# Patient Record
Sex: FEMALE | Race: WHITE | NOT HISPANIC OR LATINO | Employment: FULL TIME | URBAN - METROPOLITAN AREA
[De-identification: names, ages, dates, MRNs, and addresses within clinical notes are randomized per-mention and may not be internally consistent; named-entity substitution may affect disease eponyms.]

---

## 2018-01-24 ENCOUNTER — OFFICE VISIT (OUTPATIENT)
Dept: FAMILY MEDICINE | Facility: CLINIC | Age: 28
End: 2018-01-24
Payer: COMMERCIAL

## 2018-01-24 VITALS
WEIGHT: 124.6 LBS | TEMPERATURE: 97.9 F | BODY MASS INDEX: 20.03 KG/M2 | HEART RATE: 80 BPM | SYSTOLIC BLOOD PRESSURE: 127 MMHG | DIASTOLIC BLOOD PRESSURE: 72 MMHG | HEIGHT: 66 IN

## 2018-01-24 DIAGNOSIS — Z12.4 CERVICAL CANCER SCREENING: ICD-10-CM

## 2018-01-24 DIAGNOSIS — J30.9 CHRONIC ALLERGIC RHINITIS, UNSPECIFIED SEASONALITY, UNSPECIFIED TRIGGER: ICD-10-CM

## 2018-01-24 DIAGNOSIS — Z30.41 SURVEILLANCE OF PREVIOUSLY PRESCRIBED CONTRACEPTIVE PILL: ICD-10-CM

## 2018-01-24 DIAGNOSIS — Z00.00 ROUTINE GENERAL MEDICAL EXAMINATION AT A HEALTH CARE FACILITY: Primary | ICD-10-CM

## 2018-01-24 PROCEDURE — G0145 SCR C/V CYTO,THINLAYER,RESCR: HCPCS | Performed by: FAMILY MEDICINE

## 2018-01-24 PROCEDURE — 99385 PREV VISIT NEW AGE 18-39: CPT | Performed by: FAMILY MEDICINE

## 2018-01-24 RX ORDER — NORGESTIMATE AND ETHINYL ESTRADIOL 0.25-0.035
1 KIT ORAL
COMMUNITY
Start: 2017-12-19 | End: 2018-01-24

## 2018-01-24 RX ORDER — FLUTICASONE PROPIONATE 50 MCG
1-2 SPRAY, SUSPENSION (ML) NASAL DAILY
Qty: 1 BOTTLE | Refills: 11 | Status: SHIPPED | OUTPATIENT
Start: 2018-01-24 | End: 2020-03-09

## 2018-01-24 RX ORDER — NORGESTIMATE AND ETHINYL ESTRADIOL 0.25-0.035
1 KIT ORAL DAILY
Qty: 84 TABLET | Refills: 3 | Status: SHIPPED | OUTPATIENT
Start: 2018-01-24 | End: 2019-02-18

## 2018-01-24 NOTE — LETTER
February 2, 2018      Lizabeth Dye  [address] [facility] [address]    Dear ,      I am happy to inform you that your recent cervical cancer screening test (PAP smear) was normal.      Preventative screenings such as this help to ensure your health for years to come. You should repeat a pap smear in 3 years, unless otherwise directed.      You will still need to return to the clinic every year for your annual exam and other preventive tests.     Please contact the clinic at [phone] if you have further questions.       Sincerely,      April Velazco MD/justin

## 2018-01-24 NOTE — MR AVS SNAPSHOT
After Visit Summary   1/24/2018    Lizabeth Dye    MRN: [id number]           Patient Information     Date Of Birth          1990        Visit Information        Provider Department      1/24/2018 5:00 PM April Velazco MD [facility]        Today's Diagnoses     Routine general medical examination at a health care facility    -  1    Cervical cancer screening        Surveillance of previously prescribed contraceptive pill        Chronic allergic rhinitis, unspecified seasonality, unspecified trigger          Care Instructions      Preventive Health Recommendations  Female Ages 26 - 39  Yearly exam:   See your health care provider every year in order to    Review health changes.     Discuss preventive care.      Review your medicines if you your doctor has prescribed any.    Until age 30: Get a Pap test every three years (more often if you have had an abnormal result).    After age 30: Talk to your doctor about whether you should have a Pap test every 3 years or have a Pap test with HPV screening every 5 years.   You do not need a Pap test if your uterus was removed (hysterectomy) and you have not had cancer.  You should be tested each year for STDs (sexually transmitted diseases), if you're at risk.   Talk to your provider about how often to have your cholesterol checked.  If you are at risk for diabetes, you should have a diabetes test (fasting glucose).  Shots: Get a flu shot each year. Get a tetanus shot every 10 years.   Nutrition:     Eat at least 5 servings of fruits and vegetables each day.    Eat whole-grain bread, whole-wheat pasta and brown rice instead of white grains and rice.    Talk to your provider about Calcium and Vitamin D.     Lifestyle    Exercise at least 150 minutes a week (30 minutes a day, 5 days of the week). This will help you control your weight and prevent disease.    Limit alcohol to one drink per day.    No smoking.     Wear sunscreen to prevent skin  "cancer.    See your dentist every six months for an exam and cleaning.            Follow-ups after your visit        Follow-up notes from your care team     Return in about 1 year (around 2019) for Physical Exam and as needed throughout the year.      Who to contact     Normal or non-critical lab and imaging results will be communicated to you by [email or web address], letter or phone within 4 business days after the clinic has received the results. If you do not hear from us within 7 days, please contact the clinic through MyChart or phone. If you have a critical or abnormal lab result, we will notify you by phone as soon as possible.  Submit refill requests through Taasera or call your pharmacy and they will forward the refill request to us. Please allow 3 business days for your refill to be completed.          If you need to speak with a  for additional information , please call: [phone]             Additional Information About Your Visit        Taasera Information     Ta[employer]era lets you send messages to your doctor, view your test results, renew your prescriptions, schedule appointments and more. To sign up, go to [email or web address] . Click on \"Log in\" on the left side of the screen, which will take you to the Welcome page. Then click on \"Sign up Now\" on the right side of the page.     You will be asked to enter the access code listed below, as well as some personal information. Please follow the directions to create your username and password.     Your access code is: [email or web address]  Expires: 2018  5:24 PM     Your access code will  in 90 days. If you need help or a new code, please call your [facility] clinic or [phone].        Care EveryWhere ID     This is your Care EveryWhere ID. This could be used by other organizations to access your [facility] medical records  [id number]        Your Vitals Were     Pulse Temperature Height Last Period Breastfeeding? BMI (Body Mass Index)    80 " "97.9  F (36.6  C) (Tympanic) 5' 5.75\" (1.67 m) 01/17/2018 (Approximate) No 20.26 kg/m2       Blood Pressure from Last 3 Encounters:   01/24/18 127/72    Weight from Last 3 Encounters:   01/24/18 124 lb 9.6 oz (56.5 kg)              We Performed the Following     Pap imaged thin layer screen reflex to HPV if ASCUS - recommend age 25 - 29          Today's Medication Changes          These changes are accurate as of 1/24/18  5:24 PM.  If you have any questions, ask your nurse or doctor.               Start taking these medicines.        Dose/Directions    fluticasone 50 MCG/ACT spray   Commonly known as:  FLONASE   Used for:  Chronic allergic rhinitis, unspecified seasonality, unspecified trigger   Started by:  April Velazco MD        Dose:  1-2 spray   Spray 1-2 sprays into both nostrils daily   Quantity:  1 Bottle   Refills:  11         These medicines have changed or have updated prescriptions.        Dose/Directions    norgestimate-ethinyl estradiol 0.25-35 MG-MCG per tablet   Commonly known as:  MONONESSA   This may have changed:  when to take this   Used for:  Surveillance of previously prescribed contraceptive pill   Changed by:  April Velazco MD        Dose:  1 tablet   Take 1 tablet by mouth daily   Quantity:  84 tablet   Refills:  3            Where to get your medicines      [id number]  [facility] - [address] AT [facility] [address], [place] [address]     Phone:  [phone]     fluticasone 50 MCG/ACT spray    norgestimate-ethinyl estradiol 0.25-35 MG-MCG per tablet                Primary Care Provider Office Phone # Fax #    [facility] [phone] [address] [address]        Equal Access to Services     AUDREY KAMARA AH: Alex weisso Soomaali, waaxda luqadaha, qaybta kaalmada marimar, severino waller. So [facility] " [phone].    ATENCIÓN: Si antonio chase, tiene a carr disposición servicios gratuitos de asistencia lingüística. Anand damico [phone].    We comply with applicable federal civil rights laws and [place] laws. We do not discriminate on the basis of race, color, national origin, age, disability, sex, sexual orientation, or gender identity.            Thank you!     Thank you for choosing [facility]  for your care. Our goal is always to provide you with excellent care. Hearing back from our patients is one way we can continue to improve our services. Please take a few minutes to complete the written survey that you may receive in the mail after your visit with us. Thank you!             Your Updated Medication List - Protect others around you: Learn how to safely use, store and throw away your medicines at www.disposemymeds.org.          This list is accurate as of 1/24/18  5:24 PM.  Always use your most recent med list.                   Brand Name Dispense Instructions for use Diagnosis    cetirizine 10 MG tablet    zyrTEC     Take 10 mg by mouth daily        fluticasone 50 MCG/ACT spray    FLONASE    1 Bottle    Spray 1-2 sprays into both nostrils daily    Chronic allergic rhinitis, unspecified seasonality, unspecified trigger       norgestimate-ethinyl estradiol 0.25-35 MG-MCG per tablet    MONONESSA    84 tablet    Take 1 tablet by mouth daily    Surveillance of previously prescribed contraceptive pill

## 2018-01-29 LAB
COPATH REPORT: NORMAL
PAP: NORMAL

## 2018-02-19 ENCOUNTER — NURSE TRIAGE (OUTPATIENT)
Dept: NURSING | Facility: CLINIC | Age: 28
End: 2018-02-19

## 2019-02-18 DIAGNOSIS — Z30.41 SURVEILLANCE OF PREVIOUSLY PRESCRIBED CONTRACEPTIVE PILL: ICD-10-CM

## 2019-02-18 NOTE — TELEPHONE ENCOUNTER
"Requested Prescriptions   Pending Prescriptions Disp Refills     ESTARYLLA 0.25-35 MG-MCG tablet [Pharmacy Med Name: ESTARYLLA TABLETS 28S] 84 tablet 0    Last Written Prescription Date: 11/20/18  Last Fill Quantity: 84,  # refills: 0   Last office visit: 1/24/2018 with prescribing provider:  Juan A  Future Office Visit:   Next 5 appointments (look out 90 days)    Mar 04, 2019  5:00 PM CST  PHYS[facility] Kristen M Kehr[facility]-C  [facility] [address] [facility] [address] [phone]        Sig: TAKE 1 TABLET BY MOUTH EVERY DAY    Contraceptives Protocol Passed - 2/18/2019  5:41 PM       Passed - Patient is not a current smoker if age is 35 or older       Passed - Recent (12 mo) or future (30 days) visit within the authorizing provider's specialty    Patient had office visit in the last 12 months or has a visit in the next 30 days with authorizing provider or within the authorizing provider's specialty.  See \"Patient Info\" tab in inbasket, or \"Choose Columns\" in Meds & Orders section of the refill encounter.             Passed - Medication is active on med list       Passed - No active pregnancy on record       Passed - No positive pregnancy test in past 12 months        "

## 2019-02-19 RX ORDER — NORGESTIMATE AND ETHINYL ESTRADIOL 0.25-0.035
1 KIT ORAL DAILY
Qty: 30 TABLET | Refills: 0 | Status: SHIPPED | OUTPATIENT
Start: 2019-02-19 | End: 2019-02-19

## 2019-02-19 NOTE — TELEPHONE ENCOUNTER
Routing refill request to provider for review/approval because:  Patient needs to be seen because it has been more than 1 year since last office visit.    Last OV 1/24/18    Medication pended for 30 days with reminder overdue for yearly physical.    Camille Plascencia, RN, BSN

## 2019-03-04 ENCOUNTER — OFFICE VISIT (OUTPATIENT)
Dept: FAMILY MEDICINE | Facility: CLINIC | Age: 29
End: 2019-03-04
Payer: COMMERCIAL

## 2019-03-04 VITALS
DIASTOLIC BLOOD PRESSURE: 73 MMHG | HEART RATE: 81 BPM | BODY MASS INDEX: 19.62 KG/M2 | OXYGEN SATURATION: 99 % | HEIGHT: 67 IN | TEMPERATURE: 98.1 F | SYSTOLIC BLOOD PRESSURE: 114 MMHG | WEIGHT: 125 LBS

## 2019-03-04 DIAGNOSIS — Z30.41 SURVEILLANCE OF PREVIOUSLY PRESCRIBED CONTRACEPTIVE PILL: ICD-10-CM

## 2019-03-04 DIAGNOSIS — Z00.00 ROUTINE GENERAL MEDICAL EXAMINATION AT A HEALTH CARE FACILITY: Primary | ICD-10-CM

## 2019-03-04 DIAGNOSIS — Z91.09 HOUSE DUST MITE ALLERGY: ICD-10-CM

## 2019-03-04 PROCEDURE — 99395 PREV VISIT EST AGE 18-39: CPT | Performed by: PHYSICIAN ASSISTANT

## 2019-03-04 RX ORDER — NORGESTIMATE AND ETHINYL ESTRADIOL 0.25-0.035
1 KIT ORAL DAILY
Qty: 84 TABLET | Refills: 3 | Status: SHIPPED | OUTPATIENT
Start: 2019-03-04 | End: 2019-06-17

## 2019-03-04 RX ORDER — CETIRIZINE HYDROCHLORIDE 10 MG/1
10 TABLET ORAL DAILY
Qty: 90 TABLET | Refills: 3 | Status: SHIPPED | OUTPATIENT
Start: 2019-03-04 | End: 2020-03-09

## 2019-03-04 ASSESSMENT — ENCOUNTER SYMPTOMS
FEVER: 0
CHILLS: 0
HEMATURIA: 0
BREAST MASS: 0
WEAKNESS: 0
DIZZINESS: 0
ABDOMINAL PAIN: 0
SHORTNESS OF BREATH: 0
DYSURIA: 0
HEMATOCHEZIA: 0
JOINT SWELLING: 0
HEADACHES: 0
NERVOUS/ANXIOUS: 0
FREQUENCY: 0
SORE THROAT: 0
NAUSEA: 0
CONSTIPATION: 0
PALPITATIONS: 0
PARESTHESIAS: 0
ARTHRALGIAS: 0
MYALGIAS: 0
HEARTBURN: 0
EYE PAIN: 0
DIARRHEA: 0
COUGH: 0

## 2019-03-04 ASSESSMENT — MIFFLIN-ST. JEOR: SCORE: 1316.69

## 2019-03-04 ASSESSMENT — PAIN SCALES - GENERAL: PAINLEVEL: NO PAIN (0)

## 2019-03-04 NOTE — PROGRESS NOTES
SUBJECTIVE:   CC: Lizabeth Dye is an 28 year old woman who presents for preventive health visit.     Physical   Annual:     Getting at least 3 servings of Calcium per day:  Yes    Bi-annual eye exam:  Yes    Dental care twice a year:  NO    Sleep apnea or symptoms of sleep apnea:  None    Diet:  Regular (no restrictions)    Frequency of exercise:  4-5 days/week    Duration of exercise:  30-45 minutes    Taking medications regularly:  Yes    Medication side effects:  None    Additional concerns today:  Yes    PHQ-2 Total Score: 2              Today's PHQ-2 Score:   PHQ-2 ( 1999 [employer]) 3/4/2019   Q1: Little interest or pleasure in doing things 1   Q2: Feeling down, depressed or hopeless 1   PHQ-2 Score 2   Q1: Little interest or pleasure in doing things Several days   Q2: Feeling down, depressed or hopeless Several days   PHQ-2 Score 2       Abuse: Current or Past(Physical, Sexual or Emotional)- No  Do you feel safe in your environment? Yes    Social History     Tobacco Use     Smoking status: Never Smoker     Smokeless tobacco: Never Used   Substance Use Topics     Alcohol use: Yes     Comment: 3 x/week-wine     Alcohol Use 3/4/2019   If you drink alcohol do you typically have greater than 3 drinks per day OR greater than 7 drinks per week? No   No flowsheet data found.    Reviewed orders with patient.  Reviewed health maintenance and updated orders accordingly - Yes  BP Readings from Last 3 Encounters:   03/04/19 114/73   01/24/18 127/72    Wt Readings from Last 3 Encounters:   03/04/19 56.7 kg (125 lb)   01/24/18 56.5 kg (124 lb 9.6 oz)                  Patient Active Problem List   Diagnosis     Chronic allergic rhinitis, unspecified seasonality, unspecified trigger     Past Surgical History:   Procedure Laterality Date     NO HISTORY OF SURGERY         Social History     Tobacco Use     Smoking status: Never Smoker     Smokeless tobacco: Never Used   Substance Use Topics     Alcohol use: Yes     Comment: 3  x/week-wine     Family History   Problem Relation Age of Onset     Diabetes Father      Thyroid Disease Father      Glaucoma No family hx of      Macular Degeneration No family hx of      Breast Cancer No family hx of      Colon Cancer No family hx of          Current Outpatient Medications   Medication Sig Dispense Refill     cetirizine (ZYRTEC) 10 MG tablet Take 1 tablet (10 mg) by mouth daily 90 tablet 3     norgestimate-ethinyl estradiol (ESTARYLLA) 0.25-35 MG-MCG tablet Take 1 tablet by mouth daily 84 tablet 3     fluticasone (FLONASE) 50 MCG/ACT spray Spray 1-2 sprays into both nostrils daily (Patient not taking: Reported on 3/4/2019) 1 Bottle 11     No Known Allergies    Mammogram not appropriate for this patient based on age.    Pertinent mammograms are reviewed under the imaging tab.  History of abnormal Pap smear:   NO - age 30- 65 PAP every 3 years recommended  Last 3 Pap and HPV Results:   PAP / HPV 1/24/2018   PAP NIL     PAP / HPV 1/24/2018   PAP NIL     Reviewed and updated as needed this visit by clinical staff  Tobacco  Allergies  Meds  Med Hx  Surg Hx  Fam Hx  Soc Hx        Reviewed and updated as needed this visit by Provider        Past Medical History:   Diagnosis Date     Hx of abnormal cervical Pap smear     ASCUS in 2013; nl pap in 4/2015- See care everywhere for details.      Past Surgical History:   Procedure Laterality Date     NO HISTORY OF SURGERY         Review of Systems   Constitutional: Negative for chills and fever.   HENT: Negative for congestion, ear pain, hearing loss and sore throat.    Eyes: Negative for pain and visual disturbance.   Respiratory: Negative for cough and shortness of breath.    Cardiovascular: Negative for chest pain, palpitations and peripheral edema.   Gastrointestinal: Negative for abdominal pain, constipation, diarrhea, heartburn, hematochezia and nausea.   Breasts:  Negative for tenderness, breast mass and discharge.   Genitourinary: Negative for  "dysuria, frequency, genital sores, hematuria, pelvic pain, urgency, vaginal bleeding and vaginal discharge.   Musculoskeletal: Negative for arthralgias, joint swelling and myalgias.   Skin: Negative for rash.   Neurological: Negative for dizziness, weakness, headaches and paresthesias.   Psychiatric/Behavioral: Negative for mood changes. The patient is not nervous/anxious.           OBJECTIVE:   /73   Pulse 81   Temp 98.1  F (36.7  C) (Oral)   Ht 1.689 m (5' 6.5\")   Wt 56.7 kg (125 lb)   LMP  (LMP Unknown)   SpO2 99%   BMI 19.87 kg/m    Physical Exam  GENERAL: healthy, alert and no distress  EYES: Eyes grossly normal to inspection, PERRL and conjunctivae and sclerae normal  HENT: ear canals and TM's normal, nose and mouth without ulcers or lesions  NECK: no adenopathy, no asymmetry, masses, or scars and thyroid normal to palpation  RESP: lungs clear to auscultation - no rales, rhonchi or wheezes  BREAST: normal without masses, tenderness or nipple discharge and no palpable axillary masses or adenopathy  CV: regular rate and rhythm, normal S1 S2, no S3 or S4, no murmur, click or rub, no peripheral edema and peripheral pulses strong  ABDOMEN: soft, nontender, no hepatosplenomegaly, no masses and bowel sounds normal  MS: no gross musculoskeletal defects noted, no edema  SKIN: no suspicious lesions or rashes  NEURO: Normal strength and tone, mentation intact and speech normal  PSYCH: mentation appears normal, affect normal/bright        ASSESSMENT/PLAN:   1. Routine general medical examination at a health care facility  Health maintenance reviewed and updated.    2. House dust mite allergy  Refills given  - cetirizine (ZYRTEC) 10 MG tablet; Take 1 tablet (10 mg) by mouth daily  Dispense: 90 tablet; Refill: 3    3. Surveillance of previously prescribed contraceptive pill  - norgestimate-ethinyl estradiol (ESTARYLLA) 0.25-35 MG-MCG tablet; Take 1 tablet by mouth daily  Dispense: 84 tablet; Refill: " "3    COUNSELING:  Reviewed preventive health counseling, as reflected in patient instructions       Regular exercise       Healthy diet/nutrition       Contraception    BP Readings from Last 1 Encounters:   03/04/19 114/73     Estimated body mass index is 19.87 kg/m  as calculated from the following:    Height as of this encounter: 1.689 m (5' 6.5\").    Weight as of this encounter: 56.7 kg (125 lb).           reports that  has never smoked. she has never used smokeless tobacco.      Counseling Resources:  ATP IV Guidelines  Pooled Cohorts Equation Calculator  Breast Cancer Risk Calculator  FRAX Risk Assessment  ICSI Preventive Guidelines  Dietary Guidelines for Americans, 2010  USDA's MyPlate  ASA Prophylaxis  Lung CA Screening    Kristen M. Kehr, PA-C  [facility]  "

## 2019-03-04 NOTE — NURSING NOTE
"Chief Complaint   Patient presents with     Physical       Initial /73   Pulse 81   Temp 98.1  F (36.7  C) (Oral)   Ht 1.689 m (5' 6.5\")   Wt 56.7 kg (125 lb)   LMP  (LMP Unknown)   SpO2 99%   BMI 19.87 kg/m   Estimated body mass index is 19.87 kg/m  as calculated from the following:    Height as of this encounter: 1.689 m (5' 6.5\").    Weight as of this encounter: 56.7 kg (125 lb).  Medication Reconciliation: complete    MARTINA Cabrera MA    "

## 2019-06-17 ENCOUNTER — OFFICE VISIT (OUTPATIENT)
Dept: OBGYN | Facility: CLINIC | Age: 29
End: 2019-06-17
Payer: COMMERCIAL

## 2019-06-17 VITALS
WEIGHT: 120.8 LBS | DIASTOLIC BLOOD PRESSURE: 70 MMHG | HEART RATE: 80 BPM | SYSTOLIC BLOOD PRESSURE: 109 MMHG | BODY MASS INDEX: 19.21 KG/M2 | OXYGEN SATURATION: 100 %

## 2019-06-17 DIAGNOSIS — N91.2 AMENORRHEA: Primary | ICD-10-CM

## 2019-06-17 DIAGNOSIS — R89.9 ABNORMAL LABORATORY TEST RESULT: ICD-10-CM

## 2019-06-17 LAB
B-HCG SERPL-ACNC: <1 IU/L (ref 0–5)
ESTRADIOL SERPL-MCNC: 85 PG/ML
FSH SERPL-ACNC: 7.1 IU/L
LH SERPL-ACNC: 6.8 IU/L
PROLACTIN SERPL-MCNC: 5 UG/L (ref 3–27)
T4 FREE SERPL-MCNC: 0.8 NG/DL (ref 0.76–1.46)
TSH SERPL DL<=0.005 MIU/L-ACNC: 5.36 MU/L (ref 0.4–4)

## 2019-06-17 PROCEDURE — 83002 ASSAY OF GONADOTROPIN (LH): CPT | Performed by: NURSE PRACTITIONER

## 2019-06-17 PROCEDURE — 84403 ASSAY OF TOTAL TESTOSTERONE: CPT | Performed by: NURSE PRACTITIONER

## 2019-06-17 PROCEDURE — 84146 ASSAY OF PROLACTIN: CPT | Performed by: NURSE PRACTITIONER

## 2019-06-17 PROCEDURE — 84270 ASSAY OF SEX HORMONE GLOBUL: CPT | Performed by: NURSE PRACTITIONER

## 2019-06-17 PROCEDURE — 36415 COLL VENOUS BLD VENIPUNCTURE: CPT | Performed by: NURSE PRACTITIONER

## 2019-06-17 PROCEDURE — 82670 ASSAY OF TOTAL ESTRADIOL: CPT | Performed by: NURSE PRACTITIONER

## 2019-06-17 PROCEDURE — 84439 ASSAY OF FREE THYROXINE: CPT | Performed by: NURSE PRACTITIONER

## 2019-06-17 PROCEDURE — 99203 OFFICE O/P NEW LOW 30 MIN: CPT | Performed by: NURSE PRACTITIONER

## 2019-06-17 PROCEDURE — 83001 ASSAY OF GONADOTROPIN (FSH): CPT | Performed by: NURSE PRACTITIONER

## 2019-06-17 PROCEDURE — 84443 ASSAY THYROID STIM HORMONE: CPT | Performed by: NURSE PRACTITIONER

## 2019-06-17 PROCEDURE — 84702 CHORIONIC GONADOTROPIN TEST: CPT | Performed by: NURSE PRACTITIONER

## 2019-06-17 RX ORDER — ACETAMINOPHEN 325 MG/1
325-650 TABLET ORAL EVERY 6 HOURS PRN
COMMUNITY
End: 2020-03-12

## 2019-06-17 NOTE — PROGRESS NOTES
Subjective     Lizabeth Dye is a 28 year old female who presents to clinic today for the following health issues:    HPI   Patient stopped combined oral contraceptive pills in April. Had a normal 5 day bleed at the end of that pack. Had been on combined oral contraceptive pills for approximately 10 years-started due to dysmenorrhea. Stopped in April as they desire pregnancy at this time. Prior to being on oral contraceptive pills, thinks her cycles were regular-just painful. Denies any STI history. Has had a colposcopy for abnormal pap smear over 5 years ago.  No prior history of ovarian cysts. Both parents have thyroid disorders. No personal thyroid history.   No increase stress recently, no major diet or weight changes.     Patient Active Problem List   Diagnosis     Chronic allergic rhinitis, unspecified seasonality, unspecified trigger     Past Surgical History:   Procedure Laterality Date     NO HISTORY OF SURGERY         Social History     Tobacco Use     Smoking status: Never Smoker     Smokeless tobacco: Never Used   Substance Use Topics     Alcohol use: Yes     Comment: 3 x/week-wine     Family History   Problem Relation Age of Onset     Breast Cancer Mother      Diabetes Father      Thyroid Disease Father      Glaucoma No family hx of      Macular Degeneration No family hx of      Colon Cancer No family hx of          Reviewed and updated as needed this visit by Provider  Tobacco  Allergies  Meds  Problems  Med Hx  Surg Hx  Fam Hx  Soc Hx          Review of Systems   ROS COMP: Constitutional, HEENT, cardiovascular, pulmonary, gi and gu systems are negative, except as otherwise noted.      Objective    /70   Pulse 80   Wt 54.8 kg (120 lb 12.8 oz)   LMP 04/10/2019   SpO2 100%   BMI 19.21 kg/m    Body mass index is 19.21 kg/m .  Physical Exam   GENERAL: healthy, alert and no distress  RESP: lungs clear to auscultation - no rales, rhonchi or wheezes  CV: regular rate and rhythm, normal S1 S2,  no S3 or S4, no murmur, click or rub, no peripheral edema and peripheral pulses strong  ABDOMEN: soft, nontender, no hepatosplenomegaly, no masses and bowel sounds normal   (female): normal female external genitalia, normal urethral meatus, vaginal mucosa, normal cervix/adnexa/uterus without masses or discharge  MS: no gross musculoskeletal defects noted, no edema  SKIN: no suspicious lesions or rashes  PSYCH: mentation appears normal, affect normal/bright     Assessment & Plan     1. Amenorrhea  We discussed possible etiologies of her absent cycles. Discussed rationale for below labs. Discussed management if all labs normal/negative. Discussed recommendation for cycle in the next 1-2 months. For now, if her labs are reassuring, she would like to give it one more month to see if a cycle begins on its own. If one does not, will call and we will do a Prometrium prescription for 7 days to attempt to induce cycle. After that, she would likely want to monitor cycles.  Will start PNV. Patient is given an opportunity to ask questions and have them answered.  - Follicle stimulating hormone  - Lutropin  - Prolactin  - Testosterone Free and Total  - TSH with free T4 reflex  - Estradiol  - HCG quantitative pregnancy     JESUS Carrasquillo [facility]

## 2019-06-19 LAB
SHBG SERPL-SCNC: 106 NMOL/L (ref 30–135)
TESTOST FREE SERPL-MCNC: 0.18 NG/DL (ref 0.08–0.74)
TESTOST SERPL-MCNC: 27 NG/DL (ref 8–60)

## 2019-07-08 ENCOUNTER — TELEPHONE (OUTPATIENT)
Dept: OBGYN | Facility: CLINIC | Age: 29
End: 2019-07-08

## 2019-07-08 DIAGNOSIS — N91.2 AMENORRHEA: ICD-10-CM

## 2019-07-08 DIAGNOSIS — N91.2 AMENORRHEA: Primary | ICD-10-CM

## 2019-07-08 NOTE — TELEPHONE ENCOUNTER
1. Amenorrhea  We discussed possible etiologies of her absent cycles. Discussed rationale for below labs. Discussed management if all labs normal/negative. Discussed recommendation for cycle in the next 1-2 months. For now, if her labs are reassuring, she would like to give it one more month to see if a cycle begins on its own. If one does not, will call and we will do a Prometrium prescription for 7 days to attempt to induce cycle. After that, she would likely want to monitor cycles.  Will start PNV. Patient is given an opportunity to ask questions and have them answered.    From OV 06/17/2019 with Vanessa Zavala. Routing to provider.     Pauline Jeffery RN on 7/8/2019 at 4:17 PM

## 2019-07-08 NOTE — TELEPHONE ENCOUNTER
[facility]. Patient to take 1 tablet before bed x 7 nights. To expect menstrual cycle within 7-10 days of finishing medication. If no menstrual bleeding or spotting even by 2 weeks after finishing medication, would recommend follow up. Thank you. Vanessa LEE CNP

## 2019-07-08 NOTE — TELEPHONE ENCOUNTER
Patient was told by Vanessa there was a pill she could take to jumpstart her period after going off the pill. Could she try that medication? Patient thought maybe there was a lab she needed first or something. Please advise.Ok to leave a detailed message.

## 2019-07-09 NOTE — TELEPHONE ENCOUNTER
Refill not appropriate.  Rx sent to the requesting pharmacy on 7/8/19 for a 7 day supply with an additional 0 refills.    Julieta Tyler RN

## 2020-03-09 ENCOUNTER — OFFICE VISIT (OUTPATIENT)
Dept: FAMILY MEDICINE | Facility: CLINIC | Age: 30
End: 2020-03-09
Payer: COMMERCIAL

## 2020-03-09 VITALS
WEIGHT: 122 LBS | HEART RATE: 80 BPM | TEMPERATURE: 98 F | BODY MASS INDEX: 19.61 KG/M2 | OXYGEN SATURATION: 100 % | SYSTOLIC BLOOD PRESSURE: 106 MMHG | HEIGHT: 66 IN | DIASTOLIC BLOOD PRESSURE: 67 MMHG

## 2020-03-09 DIAGNOSIS — Z53.9 ERRONEOUS ENCOUNTER--DISREGARD: Primary | ICD-10-CM

## 2020-03-09 ASSESSMENT — ENCOUNTER SYMPTOMS
NERVOUS/ANXIOUS: 0
ABDOMINAL PAIN: 0
MYALGIAS: 1
DIZZINESS: 0
DYSURIA: 0
HEMATURIA: 0
FREQUENCY: 0
CONSTIPATION: 0
HEMATOCHEZIA: 0
JOINT SWELLING: 0
SORE THROAT: 0
HEARTBURN: 0
COUGH: 0
PALPITATIONS: 0
CHILLS: 0
DIARRHEA: 0
BREAST MASS: 0
ARTHRALGIAS: 1
FEVER: 0
WEAKNESS: 0
PARESTHESIAS: 0
SHORTNESS OF BREATH: 0
NAUSEA: 0
EYE PAIN: 0
HEADACHES: 0

## 2020-03-09 ASSESSMENT — PAIN SCALES - GENERAL: PAINLEVEL: NO PAIN (0)

## 2020-03-09 ASSESSMENT — MIFFLIN-ST. JEOR: SCORE: 1282.2

## 2020-03-09 NOTE — NURSING NOTE
"Chief Complaint   Patient presents with     Physical       Initial /67   Pulse 80   Temp 98  F (36.7  C) (Oral)   Ht 1.664 m (5' 5.5\")   Wt 55.3 kg (122 lb)   LMP 02/27/2020   SpO2 100%   BMI 19.99 kg/m   Estimated body mass index is 19.99 kg/m  as calculated from the following:    Height as of this encounter: 1.664 m (5' 5.5\").    Weight as of this encounter: 55.3 kg (122 lb).  Medication Reconciliation: complete    MARTINA Cabrera MA    "

## 2020-03-09 NOTE — PROGRESS NOTES
SUBJECTIVE:     Lizabeth scheduled an appointment today thinking that she was at GYN and wanted to discuss fertility.   She worked with Nessa Zavala NP in GYN last year.       Appointment will be no charge and she was scheduled with the appropriate provider.     Kristen Kehr PA-C

## 2020-03-10 NOTE — PROGRESS NOTES
Subjective     Lizabeth Dye is a 29 year old female who presents to clinic today for the following health issues:    HPI   Infertility    Patient stopped her oral contraceptive pill last April in hopes of becoming pregnant. Seen June 2019 due to amenorrhea. Labs done then did show a slightly elevated TSH, patient did use oral progesterone to induce a cycle. Has been tracking then and they vary from 30-48 days. Uses ovulation tests and the months she has a positive test, it is later in the cycle and has seen most cycles after her positive ovulation, has a shorter luteal phase-less than 2 weeks. Is taking a PNV.  Has been on oral contraceptive pill from high school until last year, not sure what cycles were like prior. She has no history concerning for tubal factor.  Her mother and father both have thyroid disorders.  : Healthy with no chronic medical condition, no medication. He has no other children. Age 34. Works for [employer] for the last year.    Patient Active Problem List   Diagnosis     Chronic allergic rhinitis, unspecified seasonality, unspecified trigger     Past Surgical History:   Procedure Laterality Date     NO HISTORY OF SURGERY         Social History     Tobacco Use     Smoking status: Never Smoker     Smokeless tobacco: Never Used   Substance Use Topics     Alcohol use: Yes     Comment: 3 x/week-wine     Family History   Problem Relation Age of Onset     Breast Cancer Mother      Diabetes Father      Thyroid Disease Father      Glaucoma No family hx of      Macular Degeneration No family hx of      Colon Cancer No family hx of          Reviewed and updated as needed this visit by Provider         Review of Systems   ROS COMP: Constitutional, HEENT, cardiovascular, pulmonary, gi and gu systems are negative, except as otherwise noted.      Objective    /71 (BP Location: Right arm, Patient Position: Sitting, Cuff Size: Adult Regular)   Pulse 76   Temp 97.8  F (36.6  C) (Oral)   Ht  "1.676 m (5' 6\")   Wt 54.3 kg (119 lb 9.6 oz)   LMP 02/27/2020 (Exact Date)   SpO2 99%   BMI 19.30 kg/m    Body mass index is 19.3 kg/m .  Physical Exam   GENERAL: healthy, alert and no distress  RESP: lungs clear to auscultation - no rales, rhonchi or wheezes  CV: regular rate and rhythm, normal S1 S2, no S3 or S4, no murmur, click or rub, no peripheral edema and peripheral pulses strong  ABDOMEN: soft, nontender, no hepatosplenomegaly, no masses and bowel sounds normal  MS: no gross musculoskeletal defects noted, no edema  SKIN: no suspicious lesions or rashes  LYMPH: no cervical, supraclavicular, axillary, or inguinal adenopathy    Assessment & Plan     1. Irregular menses  Reviewed fertility and pregnancy in detail. Discussed rates of pregnancy after 6, 12 and 24 months of actively attempting to conceive. Reviewed tracking cycles and timing intercourse. Will continue her home ovulation tests this cycle and return to clinic approximately 1 week post presumed ovulation and will check progesterone level. With next menses, plan day 3 labs (will enter orders after progesterone completed) and the pelvic ultrasound after menses to rule out structural etiologies. No history concerning for tubal factor, so will hold off on HSG at this time. Consider HSG and SA on partner in the near future. Once all results completed, will follow up with patient. We did review use of medications such as Clomid and Femara for ovulation support. We discussed timing intercourse, continued tracking of cycles. Discussed PNV. Also discussed referral at any time to RE. Patient is given an opportunity to ask questions and have them answered.  - Progesterone; Future  - US Pelvic Complete with Transvaginal; Future    2. Procreative management counseling  Per above  - Progesterone; Future  - US Pelvic Complete with Transvaginal; Future     JESUS Carrasquillo CNP  [facility]      "

## 2020-03-12 ENCOUNTER — OFFICE VISIT (OUTPATIENT)
Dept: OBGYN | Facility: CLINIC | Age: 30
End: 2020-03-12
Payer: COMMERCIAL

## 2020-03-12 VITALS
BODY MASS INDEX: 19.22 KG/M2 | HEART RATE: 76 BPM | WEIGHT: 119.6 LBS | SYSTOLIC BLOOD PRESSURE: 105 MMHG | HEIGHT: 66 IN | TEMPERATURE: 97.8 F | DIASTOLIC BLOOD PRESSURE: 71 MMHG | OXYGEN SATURATION: 99 %

## 2020-03-12 DIAGNOSIS — Z31.69 PROCREATIVE MANAGEMENT COUNSELING: ICD-10-CM

## 2020-03-12 DIAGNOSIS — N92.6 IRREGULAR MENSES: Primary | ICD-10-CM

## 2020-03-12 PROCEDURE — 99213 OFFICE O/P EST LOW 20 MIN: CPT | Performed by: NURSE PRACTITIONER

## 2020-03-12 ASSESSMENT — MIFFLIN-ST. JEOR: SCORE: 1279.25

## 2020-03-12 ASSESSMENT — PAIN SCALES - GENERAL: PAINLEVEL: NO PAIN (0)

## 2020-03-12 NOTE — PATIENT INSTRUCTIONS
"Schedule a lab only appointment 1 week after you have a \"PEAK\" home ovulation test to check your progesterone level.    Once your next menstrual cycle begins, call [phone] to schedule a pelvic ultrasound for after your bleeding is complete.    Once your next menstrual cycle begins, call the clinic to schedule a lab only appointment on cycle day 3.  "

## 2020-03-24 DIAGNOSIS — Z31.69 PROCREATIVE MANAGEMENT COUNSELING: ICD-10-CM

## 2020-03-24 DIAGNOSIS — N92.6 IRREGULAR MENSES: ICD-10-CM

## 2020-03-24 LAB — PROGEST SERPL-MCNC: 23.3 NG/ML

## 2020-03-24 PROCEDURE — 84144 ASSAY OF PROGESTERONE: CPT | Performed by: NURSE PRACTITIONER

## 2020-03-24 PROCEDURE — 36415 COLL VENOUS BLD VENIPUNCTURE: CPT | Performed by: NURSE PRACTITIONER

## 2020-04-03 DIAGNOSIS — Z31.69 PROCREATIVE MANAGEMENT COUNSELING: ICD-10-CM

## 2020-04-03 DIAGNOSIS — N92.6 IRREGULAR MENSES: ICD-10-CM

## 2020-04-03 LAB
ESTRADIOL SERPL-MCNC: 40 PG/ML
FSH SERPL-ACNC: 5.9 IU/L
LH SERPL-ACNC: 3.6 IU/L
PROLACTIN SERPL-MCNC: 8 UG/L (ref 3–27)
T4 FREE SERPL-MCNC: 0.83 NG/DL (ref 0.76–1.46)
TSH SERPL DL<=0.005 MIU/L-ACNC: 8.89 MU/L (ref 0.4–4)

## 2020-04-03 PROCEDURE — 84146 ASSAY OF PROLACTIN: CPT | Performed by: NURSE PRACTITIONER

## 2020-04-03 PROCEDURE — 83002 ASSAY OF GONADOTROPIN (LH): CPT | Performed by: NURSE PRACTITIONER

## 2020-04-03 PROCEDURE — 84403 ASSAY OF TOTAL TESTOSTERONE: CPT | Performed by: NURSE PRACTITIONER

## 2020-04-03 PROCEDURE — 84443 ASSAY THYROID STIM HORMONE: CPT | Performed by: NURSE PRACTITIONER

## 2020-04-03 PROCEDURE — 84439 ASSAY OF FREE THYROXINE: CPT | Performed by: NURSE PRACTITIONER

## 2020-04-03 PROCEDURE — 83001 ASSAY OF GONADOTROPIN (FSH): CPT | Performed by: NURSE PRACTITIONER

## 2020-04-03 PROCEDURE — 84270 ASSAY OF SEX HORMONE GLOBUL: CPT | Performed by: NURSE PRACTITIONER

## 2020-04-03 PROCEDURE — 36415 COLL VENOUS BLD VENIPUNCTURE: CPT | Performed by: NURSE PRACTITIONER

## 2020-04-03 PROCEDURE — 82670 ASSAY OF TOTAL ESTRADIOL: CPT | Performed by: NURSE PRACTITIONER

## 2020-04-07 ENCOUNTER — ANCILLARY PROCEDURE (OUTPATIENT)
Dept: ULTRASOUND IMAGING | Facility: CLINIC | Age: 30
End: 2020-04-07
Attending: NURSE PRACTITIONER
Payer: COMMERCIAL

## 2020-04-07 DIAGNOSIS — Z31.69 PROCREATIVE MANAGEMENT COUNSELING: ICD-10-CM

## 2020-04-07 DIAGNOSIS — N92.6 IRREGULAR MENSES: ICD-10-CM

## 2020-04-07 PROCEDURE — 76830 TRANSVAGINAL US NON-OB: CPT

## 2020-04-07 PROCEDURE — 76856 US EXAM PELVIC COMPLETE: CPT | Mod: 59

## 2020-04-08 ENCOUNTER — TELEPHONE (OUTPATIENT)
Dept: OBGYN | Facility: CLINIC | Age: 30
End: 2020-04-08

## 2020-04-08 LAB
SHBG SERPL-SCNC: 104 NMOL/L (ref 30–135)
TESTOST FREE SERPL-MCNC: 0.16 NG/DL (ref 0.08–0.74)
TESTOST SERPL-MCNC: 23 NG/DL (ref 8–60)

## 2020-04-08 NOTE — TELEPHONE ENCOUNTER
Vanessa,     I spoke to this patient and went over normal ultrasound results.  Patient is wondering with all of her tests being normal, what is the next steps?  Please advise.  Julianna Valentine [facility]  April 8, 2020 8:15 AM

## 2020-04-10 ENCOUNTER — E-VISIT (OUTPATIENT)
Dept: FAMILY MEDICINE | Facility: CLINIC | Age: 30
End: 2020-04-10
Payer: COMMERCIAL

## 2020-04-10 DIAGNOSIS — Z53.9 ERRONEOUS ENCOUNTER--DISREGARD: Primary | ICD-10-CM

## 2020-04-10 NOTE — TELEPHONE ENCOUNTER
Telephone call to patient and discussed lab and ultrasound results. Would recommend she follow up with PCP first to discuss thyroid and whether medication to correct would be beneficial, then will have her follow up to discuss medication to help cycles and fertility. Questions answered. Vanessa LEE CNP

## 2020-04-10 NOTE — TELEPHONE ENCOUNTER
Patient returned call.     RN routing to [facility] for availability to speak with patient.    Katarina Cortés RN on 4/10/2020 at 12:10 PM

## 2020-04-13 ENCOUNTER — E-VISIT (OUTPATIENT)
Dept: FAMILY MEDICINE | Facility: CLINIC | Age: 30
End: 2020-04-13
Payer: COMMERCIAL

## 2020-04-13 DIAGNOSIS — E03.9 HYPOTHYROIDISM, UNSPECIFIED TYPE: Primary | ICD-10-CM

## 2020-04-13 PROCEDURE — 99421 OL DIG E/M SVC 5-10 MIN: CPT | Performed by: PHYSICIAN ASSISTANT

## 2020-04-14 PROBLEM — E03.9 HYPOTHYROIDISM, UNSPECIFIED TYPE: Status: ACTIVE | Noted: 2020-04-14

## 2020-04-14 RX ORDER — LEVOTHYROXINE SODIUM 50 UG/1
50 TABLET ORAL DAILY
Qty: 90 TABLET | Refills: 0 | Status: SHIPPED | OUTPATIENT
Start: 2020-04-14 | End: 2020-06-29

## 2020-06-29 DIAGNOSIS — E03.9 HYPOTHYROIDISM, UNSPECIFIED TYPE: ICD-10-CM

## 2020-06-29 LAB — TSH SERPL DL<=0.005 MIU/L-ACNC: 2.47 MU/L (ref 0.4–4)

## 2020-06-29 PROCEDURE — 36415 COLL VENOUS BLD VENIPUNCTURE: CPT | Performed by: PHYSICIAN ASSISTANT

## 2020-06-29 PROCEDURE — 84443 ASSAY THYROID STIM HORMONE: CPT | Performed by: PHYSICIAN ASSISTANT

## 2020-06-29 RX ORDER — LEVOTHYROXINE SODIUM 50 UG/1
50 TABLET ORAL DAILY
Qty: 90 TABLET | Refills: 3 | Status: SHIPPED | OUTPATIENT
Start: 2020-06-29 | End: 2022-02-04

## 2020-07-06 ENCOUNTER — OFFICE VISIT (OUTPATIENT)
Dept: OBGYN | Facility: CLINIC | Age: 30
End: 2020-07-06
Payer: COMMERCIAL

## 2020-07-06 VITALS
BODY MASS INDEX: 19.24 KG/M2 | OXYGEN SATURATION: 99 % | TEMPERATURE: 98 F | DIASTOLIC BLOOD PRESSURE: 62 MMHG | WEIGHT: 122.6 LBS | SYSTOLIC BLOOD PRESSURE: 100 MMHG | HEART RATE: 76 BPM | HEIGHT: 67 IN

## 2020-07-06 DIAGNOSIS — Z31.9 INFERTILITY MANAGEMENT: Primary | ICD-10-CM

## 2020-07-06 PROCEDURE — 99213 OFFICE O/P EST LOW 20 MIN: CPT | Performed by: NURSE PRACTITIONER

## 2020-07-06 ASSESSMENT — MIFFLIN-ST. JEOR: SCORE: 1300.8

## 2020-07-06 ASSESSMENT — PAIN SCALES - GENERAL: PAINLEVEL: NO PAIN (0)

## 2020-07-06 NOTE — PATIENT INSTRUCTIONS
Instructions for Ovulation Induction with Clomid    1.  Keep a menstrual diary.  Begin counting each month with the first day of bleeding.    2.  Take the Clomid each day on menstrual days 5-9.    3.  Expect ovulation on or about menstrual day # 14.    4.  You should be having intercourse at least every other day from menstrual day  12 - 16.    5.  Contact the office when you have your cycle, we will schedule a progesterone level at the lab for menstrual day 21, 22, or 23.    6.  If you have not had a cycle by day 32, take a pregnancy test.  If it is negative, take the Prometrium (200mg daily for 7 days).  Expect a cycle 5-7 days after finishing the perscription.    7.  Contact our office to determine the next dose of Clomid.

## 2020-07-06 NOTE — PROGRESS NOTES
Subjective     Lizabeth Dye is a 29 year old female who presents to clinic today for the following health issues:    HPI   Infertility    Patient presents for follow up fertility. Stopped oral contraceptive pill April 2019. Had an episode of amenorrhea and was seen approximately 2 months later. Labs done-slightly elevated TSH. Patient did a round of Prometrium in July 2019.   Seen in March and had been using ovulation tests, was getting a positive ovulation, but later in her cycle and was then having a shorter luteal phase. We repeated labs and her TSH was further elevated. Has been seeing FP for this and is on 50 mcg of Synthroid. Last TSH last week and was normal. Would like to move forward with Clomid as we previously discussed.  No history concerning for tubal factor.    planning to follow up with his FP provider for semen analysis.   Since starting the Synthroid, cycles more consistent. Went from 30-48 days prior to now 29, 28 and 30 days. Still having ovulation later in her cycle (day 17-19). Most recent cycle was slightly heavier than normal and lasted 7 days instead of her usual 5.    Patient Active Problem List   Diagnosis     Chronic allergic rhinitis, unspecified seasonality, unspecified trigger     Hypothyroidism, unspecified type     Past Surgical History:   Procedure Laterality Date     NO HISTORY OF SURGERY         Social History     Tobacco Use     Smoking status: Never Smoker     Smokeless tobacco: Never Used   Substance Use Topics     Alcohol use: Yes     Comment: 3 x/week-wine     Family History   Problem Relation Age of Onset     Breast Cancer Mother      Diabetes Father      Thyroid Disease Father      Glaucoma No family hx of      Macular Degeneration No family hx of      Colon Cancer No family hx of          Reviewed and updated as needed this visit by Provider         Review of Systems   Constitutional, HEENT, cardiovascular, pulmonary, gi and gu systems are negative, except as  "otherwise noted.      Objective    /62 (BP Location: Right arm, Patient Position: Sitting, Cuff Size: Adult Regular)   Pulse 76   Temp 98  F (36.7  C) (Tympanic)   Ht 1.689 m (5' 6.5\")   Wt 55.6 kg (122 lb 9.6 oz)   LMP 06/27/2020 (Exact Date)   SpO2 99%   BMI 19.49 kg/m    Body mass index is 19.49 kg/m .  Physical Exam   GENERAL: healthy, alert and no distress  ABDOMEN: soft, nontender, no hepatosplenomegaly, no masses and bowel sounds normal   (female): normal female external genitalia, normal urethral meatus, normal cervix/adnexa/uterus without masses   MS: no gross musculoskeletal defects noted, no edema  SKIN: no suspicious lesions or rashes  PSYCH: mentation appears normal, affect normal/bright    Assessment & Plan     1. Infertility management  Reviewed her history and her thyroid condition. She would like to move forward with Clomid. We have discussed the possible side effects of clomid including ovarian cyst formation(reversible), increased incidence of multiple pregnancy and, controversy concerning increasd risk of ovarian cancer later on in life. Patient was also advised about the timing of sexual intercourse in relation to clomid. When she begins her next cycle, will call and I will send prescription for Clomid 50mg to take days 5-9 daily. Will plan day 21 progesterone level and standing orders entered. Will do Prometrium only if no menses by day 32 and negative pregnancy test. Discussed sending her to reproductive endocrinologist if we max out on Clomid dose without having evidence of ovulation or if we complete 6 cycles and she will notify me if she wants a referral sooner.  will proceed with SA through his FP provider as they are planning. Patient is given an opportunity to ask questions and have them answered.   - Progesterone; Standing     JESUS Carrasquillo CNP  [facility]      "

## 2020-07-20 ENCOUNTER — TELEPHONE (OUTPATIENT)
Dept: OBGYN | Facility: CLINIC | Age: 30
End: 2020-07-20

## 2020-07-20 DIAGNOSIS — Z31.9 INFERTILITY MANAGEMENT: Primary | ICD-10-CM

## 2020-07-20 RX ORDER — CLOMIPHENE CITRATE 50 MG/1
50 TABLET ORAL DAILY
Qty: 5 TABLET | Refills: 0 | Status: SHIPPED | OUTPATIENT
Start: 2020-07-20 | End: 2020-08-26

## 2020-07-20 NOTE — TELEPHONE ENCOUNTER
Pt last seen on 7/6/2020, pt started her cycle and wants clomid prescription.    RN routing to provider for advisement.    Katarina Cortés RN on 7/20/2020 at 8:48 AM

## 2020-07-20 NOTE — TELEPHONE ENCOUNTER
Reason for Call:  Medication or medication refill:    Do you use a [facility]?  Name of the pharmacy and phone number for the current request:  [id number] - [place], [place] - [address]  [phone]    Name of the medication requested: clomid    Other request: patient is calling stating has started period and has questions in regards to RX above. Please call to discuss. Thank you.    Can we leave a detailed message on this number? YES    Phone number patient can be reached at: Home number on file [phone] (home)    Best Time:     Call taken on 7/20/2020 at 8:01 AM by Sierra Montez

## 2020-07-20 NOTE — TELEPHONE ENCOUNTER
Previously counseled.  Plan per Vanessa = Clomid 50mg to take days 5-9 daily.  Prescription placed.

## 2020-08-13 DIAGNOSIS — Z31.9 INFERTILITY MANAGEMENT: ICD-10-CM

## 2020-08-13 LAB — PROGEST SERPL-MCNC: 4.9 NG/ML

## 2020-08-13 PROCEDURE — 84144 ASSAY OF PROGESTERONE: CPT | Performed by: NURSE PRACTITIONER

## 2020-08-13 PROCEDURE — 36415 COLL VENOUS BLD VENIPUNCTURE: CPT | Performed by: NURSE PRACTITIONER

## 2020-08-19 DIAGNOSIS — Z31.9 INFERTILITY MANAGEMENT: ICD-10-CM

## 2020-08-19 LAB — PROGEST SERPL-MCNC: 46.4 NG/ML

## 2020-08-19 PROCEDURE — 84144 ASSAY OF PROGESTERONE: CPT | Performed by: NURSE PRACTITIONER

## 2020-08-19 PROCEDURE — 36415 COLL VENOUS BLD VENIPUNCTURE: CPT | Performed by: NURSE PRACTITIONER

## 2020-08-25 ENCOUNTER — MYC MEDICAL ADVICE (OUTPATIENT)
Dept: OBGYN | Facility: CLINIC | Age: 30
End: 2020-08-25

## 2020-08-25 DIAGNOSIS — Z31.9 INFERTILITY MANAGEMENT: ICD-10-CM

## 2020-08-26 RX ORDER — CLOMIPHENE CITRATE 50 MG/1
50 TABLET ORAL DAILY
Qty: 5 TABLET | Refills: 0 | Status: SHIPPED | OUTPATIENT
Start: 2020-08-26 | End: 2020-10-22

## 2020-08-26 NOTE — TELEPHONE ENCOUNTER
Per 8/19 progesterone lab result note:  Lizabeth,   Your progesterone level confirms ovulation this cycle-looks like it was later in your cycle. If you are not pregnant, we will plan to keep you on the same dose of Clomid, but have you take it cycle days 3-7 to see if we can get ovulation to occur earlier in the cycle Please let me know if you have any questions.  Sincerely,   Vanessa LEE CNP

## 2020-09-25 ENCOUNTER — MYC MEDICAL ADVICE (OUTPATIENT)
Dept: OBGYN | Facility: CLINIC | Age: 30
End: 2020-09-25

## 2020-09-25 NOTE — TELEPHONE ENCOUNTER
Pt last seen 7/6/2020 for infertility.    Pt asking if she should continue with clomid. Pt states she has not ovulated until CD#20 which is later than when she was not taking clomid.    RN routing to provider for advisement.    Katarina Cortés RN on 9/25/2020 at 4:36 PM

## 2020-09-28 DIAGNOSIS — Z31.9 INFERTILITY MANAGEMENT: ICD-10-CM

## 2020-09-29 NOTE — TELEPHONE ENCOUNTER
clomiPHENE (CLOMID) 50 MG tablet      Last Written Prescription Date:  8/26/2020  Last Fill Quantity: 5,   # refills: 0  Last Office Visit: 7/6/2020  Future Office visit:       Routing refill request to provider for review/approval because:  Drug not on the [facility], [facility] or  Health refill protocol or controlled substance  Looks like JESUS Gray CNP, is waiting to hear back from pt via [facility] on what she would like to do.    This rx request was sent yesterday.    Julieta Tyler RN

## 2020-09-30 RX ORDER — CLOMIPHENE CITRATE 50 MG/1
TABLET ORAL
Refills: 0 | OUTPATIENT
Start: 2020-09-30

## 2020-10-21 ENCOUNTER — MYC MEDICAL ADVICE (OUTPATIENT)
Dept: OBGYN | Facility: CLINIC | Age: 30
End: 2020-10-21

## 2020-10-21 DIAGNOSIS — Z31.9 INFERTILITY MANAGEMENT: ICD-10-CM

## 2020-10-22 RX ORDER — CLOMIPHENE CITRATE 50 MG/1
50 TABLET ORAL DAILY
Qty: 5 TABLET | Refills: 0 | Status: SHIPPED | OUTPATIENT
Start: 2020-10-22 | End: 2020-11-20

## 2020-10-22 NOTE — TELEPHONE ENCOUNTER
Per 9/25 mychart encounter:  So the next step is to let me know once your next cycle begins and we will send in the new Clomid prescription for you to take on days 3-7 and then plan a day 21 progesterone level during that cycle

## 2020-11-10 DIAGNOSIS — Z31.9 INFERTILITY MANAGEMENT: ICD-10-CM

## 2020-11-10 LAB — PROGEST SERPL-MCNC: 24.8 NG/ML

## 2020-11-10 PROCEDURE — 36415 COLL VENOUS BLD VENIPUNCTURE: CPT | Performed by: NURSE PRACTITIONER

## 2020-11-10 PROCEDURE — 84144 ASSAY OF PROGESTERONE: CPT | Performed by: NURSE PRACTITIONER

## 2020-11-20 ENCOUNTER — MYC MEDICAL ADVICE (OUTPATIENT)
Dept: OBGYN | Facility: CLINIC | Age: 30
End: 2020-11-20

## 2020-11-20 DIAGNOSIS — Z31.9 INFERTILITY MANAGEMENT: ICD-10-CM

## 2020-11-20 RX ORDER — CLOMIPHENE CITRATE 50 MG/1
50 TABLET ORAL DAILY
Qty: 5 TABLET | Refills: 0 | Status: SHIPPED | OUTPATIENT
Start: 2020-11-20 | End: 2020-12-21

## 2020-11-20 NOTE — TELEPHONE ENCOUNTER
Pt was last seen in office with JESUS Gray CNP, on 7/6/2020.    Per 10/21 mychart encounter:  Lizabeth,  I sent the prescription to your pharmacy. Please take 1 tablet daily on cycle day 3-7. You can also schedule a lab only appointment on cycle day 21 of this cycle.  Please let me know if you have any questions.  Sincerely,  Vanessa LEE CNP    Per 11/10/2020 day #21 progesterone lab result note:  Lizabeth,  Your progesterone level shows ovulation this cycle. Please let me know if you have any questions.  Sincerely,  Vanessa LEE CNP

## 2020-12-09 DIAGNOSIS — Z31.9 INFERTILITY MANAGEMENT: ICD-10-CM

## 2020-12-09 LAB — PROGEST SERPL-MCNC: 45.2 NG/ML

## 2020-12-09 PROCEDURE — 84144 ASSAY OF PROGESTERONE: CPT | Performed by: NURSE PRACTITIONER

## 2020-12-09 PROCEDURE — 36415 COLL VENOUS BLD VENIPUNCTURE: CPT | Performed by: NURSE PRACTITIONER

## 2020-12-20 ENCOUNTER — HEALTH MAINTENANCE LETTER (OUTPATIENT)
Age: 30
End: 2020-12-20

## 2020-12-20 ENCOUNTER — MYC MEDICAL ADVICE (OUTPATIENT)
Dept: OBGYN | Facility: CLINIC | Age: 30
End: 2020-12-20

## 2020-12-20 DIAGNOSIS — Z31.9 INFERTILITY MANAGEMENT: ICD-10-CM

## 2020-12-20 DIAGNOSIS — E03.9 HYPOTHYROIDISM, UNSPECIFIED TYPE: Primary | ICD-10-CM

## 2020-12-21 RX ORDER — CLOMIPHENE CITRATE 50 MG/1
50 TABLET ORAL DAILY
Qty: 5 TABLET | Refills: 0 | Status: SHIPPED | OUTPATIENT
Start: 2020-12-21 | End: 2021-01-15

## 2020-12-21 NOTE — TELEPHONE ENCOUNTER
Pt was last seen with JESUS Gray CNP, ON 7/6/2020.      Per 11/20/2020 mychart encounter, Vanessa placed an [facility] referral for pt.  Per 12/9 progesterone lab result note:    Vanessa Zavala APRN CNP   12/10/2020  8:19 AM SIERRA Barone,     Your progesterone this cycle confirms ovulation this cycle. Please let me know if you have any questions.     Sincerely,     Vanessa LEE CNP

## 2021-01-01 NOTE — PATIENT INSTRUCTIONS

## 2021-01-14 ENCOUNTER — MYC MEDICAL ADVICE (OUTPATIENT)
Dept: OBGYN | Facility: CLINIC | Age: 31
End: 2021-01-14

## 2021-01-14 DIAGNOSIS — Z31.9 INFERTILITY MANAGEMENT: ICD-10-CM

## 2021-01-14 NOTE — TELEPHONE ENCOUNTER
"Pt last seen 7/6/2020 for infertility.    Pt started spotting \"had one spot\" today, CD#1. Pt asking for clomid prescription, RN routing to provider for advisement.    Katarina Cortés RN on 1/14/2021 at 3:23 PM    "

## 2021-01-15 RX ORDER — CLOMIPHENE CITRATE 50 MG/1
50 TABLET ORAL DAILY
Qty: 5 TABLET | Refills: 0 | Status: SHIPPED | OUTPATIENT
Start: 2021-01-15 | End: 2022-02-04

## 2021-02-03 DIAGNOSIS — Z31.9 INFERTILITY MANAGEMENT: ICD-10-CM

## 2021-02-03 DIAGNOSIS — E03.9 HYPOTHYROIDISM, UNSPECIFIED TYPE: ICD-10-CM

## 2021-02-03 LAB
PROGEST SERPL-MCNC: 15.3 NG/ML
T4 FREE SERPL-MCNC: 1.01 NG/DL (ref 0.76–1.46)
TSH SERPL DL<=0.005 MIU/L-ACNC: 5.72 MU/L (ref 0.4–4)

## 2021-02-03 PROCEDURE — 36415 COLL VENOUS BLD VENIPUNCTURE: CPT | Performed by: NURSE PRACTITIONER

## 2021-02-03 PROCEDURE — 84443 ASSAY THYROID STIM HORMONE: CPT | Performed by: NURSE PRACTITIONER

## 2021-02-03 PROCEDURE — 84439 ASSAY OF FREE THYROXINE: CPT | Performed by: NURSE PRACTITIONER

## 2021-02-03 PROCEDURE — 84144 ASSAY OF PROGESTERONE: CPT | Performed by: NURSE PRACTITIONER

## 2021-02-05 ENCOUNTER — TRANSFERRED RECORDS (OUTPATIENT)
Dept: HEALTH INFORMATION MANAGEMENT | Facility: CLINIC | Age: 31
End: 2021-02-05

## 2021-04-24 ENCOUNTER — HEALTH MAINTENANCE LETTER (OUTPATIENT)
Age: 31
End: 2021-04-24

## 2021-10-03 ENCOUNTER — HEALTH MAINTENANCE LETTER (OUTPATIENT)
Age: 31
End: 2021-10-03

## 2021-12-01 ENCOUNTER — TRANSFERRED RECORDS (OUTPATIENT)
Dept: HEALTH INFORMATION MANAGEMENT | Facility: CLINIC | Age: 31
End: 2021-12-01
Payer: COMMERCIAL

## 2022-01-11 ENCOUNTER — TRANSFERRED RECORDS (OUTPATIENT)
Dept: HEALTH INFORMATION MANAGEMENT | Facility: CLINIC | Age: 32
End: 2022-01-11
Payer: COMMERCIAL

## 2022-01-14 ENCOUNTER — PRENATAL OFFICE VISIT (OUTPATIENT)
Dept: OBGYN | Facility: CLINIC | Age: 32
End: 2022-01-14
Payer: COMMERCIAL

## 2022-01-14 DIAGNOSIS — Z23 NEED FOR TDAP VACCINATION: ICD-10-CM

## 2022-01-14 DIAGNOSIS — Z34.01 ENCOUNTER FOR SUPERVISION OF NORMAL FIRST PREGNANCY IN FIRST TRIMESTER: Primary | ICD-10-CM

## 2022-01-14 PROCEDURE — 99207 PR NO CHARGE NURSE ONLY: CPT

## 2022-01-14 RX ORDER — PRENATAL VIT/IRON FUM/FOLIC AC 27MG-0.8MG
1 TABLET ORAL DAILY
COMMUNITY

## 2022-01-14 NOTE — PROGRESS NOTES
Telephone visit with patient for New Prenatal Intake and Education. This is patient's 1st pregnancy. Handouts reviewed and will be provided at next prenatal appointment. Scheduled for New Prenatal with Vanessa LEE CNP on 2/4/22.       Important Information for Provider:   IVF frozen embryo transfer on December 8, 2021.    Pt is on Progesterone injections and estrogen oral (unsure of dose) ending Jan 28th. Need to be added to medication list upon pt providing dosages.    Pt states had first ultrasound on 1/11/22.  Patient supplied answers from flow sheet for:  Prenatal OB Questionnaire.  Past Medical History  Have you ever recieved care for your mental health? : (!) Yes (see's a therapist related to infertility issues.)  Have you ever been in a major accident or suffered serious trauma?: No  Within the last year, has anyone hit, slapped, kicked or otherwise hurt you?: No  In the last year, has anyone forced you to have sex when you didn't want to?: No    Past Medical History 2   Have you ever received a blood transfusion?: No  Would you accept a blood transfusion if was medically recommended?: Yes  Does anyone in your home smoke?: No   Is your blood type Rh negative?: Unknown  Have you ever ?: No  Have you been hospitalized for a nonsurgical reason excluding normal delivery?: (!) Yes (dehydration from flu as a child)  Have you ever had an abnormal pap smear?: (!) Yes ([place])    Past Medical History (Continued)  Do you have a history of abnormalities of the uterus?: No  Did your mother take JABIER or any other hormones when she was pregnant with you?: Unknown  Do you have any other problems we have not asked about which you feel may be important to this pregnancy?: No    Allergies as of 1/14/2022:    Allergies as of 01/14/2022 - Reviewed 01/14/2022   Allergen Reaction Noted     No clinical screening - see comments Itching 11/08/2009       Current medications are:  Current Outpatient Medications    Medication Sig Dispense Refill     Prenatal Vit-Fe Fumarate-FA (PRENATAL MULTIVITAMIN W/IRON) 27-0.8 MG tablet Take 1 tablet by mouth daily       clomiPHENE (CLOMID) 50 MG tablet Take 1 tablet (50 mg) by mouth daily On days 3-7 of your cycle (Patient not taking: Reported on 1/14/2022) 5 tablet 0     levothyroxine (SYNTHROID/LEVOTHROID) 50 MCG tablet Take 1 tablet (50 mcg) by mouth daily (Patient taking differently: Take 100 mcg by mouth daily ) 90 tablet 3       Early ultrasound screening tool:    Does patient have irregular periods?  N/A  Did patient use hormonal birth control in the three months prior to positive urine pregnancy test? No  Is the patient breastfeeding?  No  Is the patient 10 weeks or greater at time of education visit?  No    Pt had IVF - had first ultrasound 1/11/22 per pt.      [place], VASILEN RN

## 2022-01-23 ENCOUNTER — HEALTH MAINTENANCE LETTER (OUTPATIENT)
Age: 32
End: 2022-01-23

## 2022-01-31 ENCOUNTER — NURSE TRIAGE (OUTPATIENT)
Dept: NURSING | Facility: CLINIC | Age: 32
End: 2022-01-31
Payer: COMMERCIAL

## 2022-02-04 ENCOUNTER — PRENATAL OFFICE VISIT (OUTPATIENT)
Dept: OBGYN | Facility: CLINIC | Age: 32
End: 2022-02-04
Payer: COMMERCIAL

## 2022-02-04 VITALS
HEIGHT: 67 IN | DIASTOLIC BLOOD PRESSURE: 83 MMHG | WEIGHT: 139.4 LBS | BODY MASS INDEX: 21.88 KG/M2 | HEART RATE: 88 BPM | SYSTOLIC BLOOD PRESSURE: 109 MMHG | OXYGEN SATURATION: 100 %

## 2022-02-04 DIAGNOSIS — Z34.01 ENCOUNTER FOR SUPERVISION OF NORMAL FIRST PREGNANCY IN FIRST TRIMESTER: ICD-10-CM

## 2022-02-04 DIAGNOSIS — E03.9 HYPOTHYROIDISM, UNSPECIFIED TYPE: ICD-10-CM

## 2022-02-04 DIAGNOSIS — O09.00 SUPERVISION OF PREGNANCY WITH HISTORY OF INFERTILITY: Primary | ICD-10-CM

## 2022-02-04 LAB
ABO/RH(D): NORMAL
ANTIBODY SCREEN: NEGATIVE
ERYTHROCYTE [DISTWIDTH] IN BLOOD BY AUTOMATED COUNT: 12.7 % (ref 10–15)
HCT VFR BLD AUTO: 36.4 % (ref 35–47)
HGB BLD-MCNC: 12.1 G/DL (ref 11.7–15.7)
MCH RBC QN AUTO: 28.2 PG (ref 26.5–33)
MCHC RBC AUTO-ENTMCNC: 33.2 G/DL (ref 31.5–36.5)
MCV RBC AUTO: 85 FL (ref 78–100)
PLATELET # BLD AUTO: 283 10E3/UL (ref 150–450)
RBC # BLD AUTO: 4.29 10E6/UL (ref 3.8–5.2)
SPECIMEN EXPIRATION DATE: NORMAL
TSH SERPL DL<=0.005 MIU/L-ACNC: 2.75 MU/L (ref 0.4–4)
WBC # BLD AUTO: 10.8 10E3/UL (ref 4–11)

## 2022-02-04 PROCEDURE — 99207 PR FIRST OB VISIT: CPT | Performed by: NURSE PRACTITIONER

## 2022-02-04 PROCEDURE — 87389 HIV-1 AG W/HIV-1&-2 AB AG IA: CPT | Performed by: NURSE PRACTITIONER

## 2022-02-04 PROCEDURE — 86780 TREPONEMA PALLIDUM: CPT | Performed by: NURSE PRACTITIONER

## 2022-02-04 PROCEDURE — 36415 COLL VENOUS BLD VENIPUNCTURE: CPT | Performed by: NURSE PRACTITIONER

## 2022-02-04 PROCEDURE — 87086 URINE CULTURE/COLONY COUNT: CPT | Performed by: NURSE PRACTITIONER

## 2022-02-04 PROCEDURE — 86803 HEPATITIS C AB TEST: CPT | Performed by: NURSE PRACTITIONER

## 2022-02-04 PROCEDURE — 85027 COMPLETE CBC AUTOMATED: CPT | Performed by: NURSE PRACTITIONER

## 2022-02-04 PROCEDURE — 87624 HPV HI-RISK TYP POOLED RSLT: CPT | Performed by: NURSE PRACTITIONER

## 2022-02-04 PROCEDURE — 87340 HEPATITIS B SURFACE AG IA: CPT | Performed by: NURSE PRACTITIONER

## 2022-02-04 PROCEDURE — 86850 RBC ANTIBODY SCREEN: CPT | Performed by: NURSE PRACTITIONER

## 2022-02-04 PROCEDURE — 86762 RUBELLA ANTIBODY: CPT | Performed by: NURSE PRACTITIONER

## 2022-02-04 PROCEDURE — G0145 SCR C/V CYTO,THINLAYER,RESCR: HCPCS | Performed by: NURSE PRACTITIONER

## 2022-02-04 PROCEDURE — 87591 N.GONORRHOEAE DNA AMP PROB: CPT | Performed by: NURSE PRACTITIONER

## 2022-02-04 PROCEDURE — 84443 ASSAY THYROID STIM HORMONE: CPT | Performed by: NURSE PRACTITIONER

## 2022-02-04 PROCEDURE — 86901 BLOOD TYPING SEROLOGIC RH(D): CPT | Performed by: NURSE PRACTITIONER

## 2022-02-04 PROCEDURE — 86900 BLOOD TYPING SEROLOGIC ABO: CPT | Performed by: NURSE PRACTITIONER

## 2022-02-04 PROCEDURE — 87491 CHLMYD TRACH DNA AMP PROBE: CPT | Performed by: NURSE PRACTITIONER

## 2022-02-04 RX ORDER — LEVOTHYROXINE SODIUM 100 UG/1
TABLET ORAL
COMMUNITY
Start: 2022-02-01 | End: 2022-04-05 | Stop reason: DRUGHIGH

## 2022-02-04 ASSESSMENT — PAIN SCALES - GENERAL: PAINLEVEL: NO PAIN (0)

## 2022-02-04 ASSESSMENT — MIFFLIN-ST. JEOR: SCORE: 1367

## 2022-02-04 NOTE — PROGRESS NOTES
Lizabeth is a 31 year old  @ nearly 11 weeks here for new OB visit.    Patient has been working with [facility] since 2021. Frozen embryo transfer in December.    See OB questionnaire for pertinent components of HPI.    OBhx: never pregnant  Gyne: Pap smears 1 prior abnormal  Past Medical History:   Diagnosis Date     Hx of abnormal cervical Pap smear     ASCUS in ; nl pap in 2015- See care everywhere for details.     Migraine      Past Surgical History:   Procedure Laterality Date     NO HISTORY OF SURGERY       Patient Active Problem List    Diagnosis Date Noted     Supervision of pregnancy with history of infertility 2022     Priority: Medium     Need for Tdap vaccination 2022     Priority: Medium     Hypothyroidism, unspecified type 2020     Priority: Medium     Chronic allergic rhinitis, unspecified seasonality, unspecified trigger 2018     Priority: Medium        Allergies   Allergen Reactions     No Clinical Screening - See Comments Itching     Cats,dogs     Current Outpatient Medications   Medication Sig Dispense Refill     clomiPHENE (CLOMID) 50 MG tablet Take 1 tablet (50 mg) by mouth daily On days 3-7 of your cycle (Patient not taking: Reported on 2022) 5 tablet 0     levothyroxine (SYNTHROID/LEVOTHROID) 50 MCG tablet Take 1 tablet (50 mcg) by mouth daily (Patient taking differently: Take 100 mcg by mouth daily ) 90 tablet 3     Prenatal Vit-Fe Fumarate-FA (PRENATAL MULTIVITAMIN W/IRON) 27-0.8 MG tablet Take 1 tablet by mouth daily         Review of Systems:     CONSTITUTIONAL:NEGATIVE for fever, chills, change in weight  INTEGUMENTARY/SKIN: NEGATIVE for worrisome rashes, moles or lesions  EYES: NEGATIVE for vision changes or irritation  ENT/MOUTH: NEGATIVE for ear, mouth and throat problems  RESP:NEGATIVE for significant cough or SOB  BREAST: NEGATIVE for masses, tenderness or discharge  CV: NEGATIVE for chest pain, palpitations or peripheral  "edema  GI: NEGATIVE for nausea, abdominal pain, heartburn, or change in bowel habits  :  Denies current vaginal bleeding, abnormal vaginal discharge  NEURO: NEGATIVE for weakness, dizziness or paresthesias  HEME/ALLERGY/IMMUNE: NEGATIVE for bleeding problems  PSYCHIATRIC: NEGATIVE for changes in mood or affect      Past Medical History of Father of Baby: No significant medical history  History Since Last Menstrual Period: No Problems    Physical Exam:   /83 (BP Location: Right arm, Patient Position: Sitting, Cuff Size: Adult Regular)   Pulse 88   Ht 1.689 m (5' 6.5\")   Wt 63.2 kg (139 lb 6.4 oz)   SpO2 100%   BMI 22.16 kg/m    General: Well developed, well nourished female  Skin: Normal  Neck: Supple,no adenopathy,thyroid normal  Chest: Clear to auscultation  Heart: Regular rate, rhythm,No murmur, rub, gallop  Abdomen: Benign and No masses, organomegaly    Extremities: Normal  Neurological: Normal   Perineum: Intact   Vulva: Normal  Vagina: Normal mucosa, no discharge  Cervix: Nulliparous    A/P 31 year old  at nearly 11 weeks  Discussed physician coverage, tertiary support, diet, exercise, weight gain, schedule of visits, routine and indicated ultrasounds, and childbirth education.  Prenatal labs: Prenatal Panel, GC, Chlamydia, Urine Culture, Pap smear, TSH  Continue taking prenatal vitamins  Discussed maternal quad screening between 15-20 weeks and reviewed benefits and limitations.    Discussed aspirin use in pregnancy.  Low-dose aspirin prophylaxis can be beneficial in women at high risk of developing preeclampsia.  I generally recommend we begin aspirin at about 12-13 weeks gestation and continue until at least 36 weeks.     Women with at least one of the following conditions are considered high risk for developing preeclampsia: Previous pregnancy with preeclampsia,  multifetal-gestation, chronic hypertension, diabetes mellitus, chronic kidney disease, autoimmune disease.     Women with more " than 1 of the following conditions may also consider low-dose aspirin prophylaxis in pregnancy: Nulliparity, BMI greater than 30, family history of preeclampsia (mother or sister), AMA, socio-demographic characteristics, personal risk factors.        COVID-19 vaccination x 2-recommend booster.    [facility] her initial ultrasound for dating.    Check TSH today.     Vanessa LEE CNP

## 2022-02-05 LAB
BACTERIA UR CULT: NO GROWTH
C TRACH DNA SPEC QL NAA+PROBE: NEGATIVE
N GONORRHOEA DNA SPEC QL NAA+PROBE: NEGATIVE
T PALLIDUM AB SER QL: NONREACTIVE

## 2022-02-06 LAB
HBV SURFACE AG SERPL QL IA: NONREACTIVE
HCV AB SERPL QL IA: NONREACTIVE
HIV 1+2 AB+HIV1 P24 AG SERPL QL IA: NONREACTIVE

## 2022-02-07 PROBLEM — Z67.91 RH NEGATIVE STATE IN ANTEPARTUM PERIOD: Status: ACTIVE | Noted: 2022-02-07

## 2022-02-07 PROBLEM — O26.899 RH NEGATIVE STATE IN ANTEPARTUM PERIOD: Status: ACTIVE | Noted: 2022-02-07

## 2022-02-07 LAB
RUBV IGG SERPL QL IA: 2.5 INDEX
RUBV IGG SERPL QL IA: POSITIVE

## 2022-02-08 LAB
BKR LAB AP GYN ADEQUACY: NORMAL
BKR LAB AP GYN INTERPRETATION: NORMAL
BKR LAB AP HPV REFLEX: NORMAL
BKR LAB AP PREVIOUS ABNORMAL: NORMAL
PATH REPORT.COMMENTS IMP SPEC: NORMAL
PATH REPORT.COMMENTS IMP SPEC: NORMAL
PATH REPORT.RELEVANT HX SPEC: NORMAL

## 2022-02-10 LAB
HUMAN PAPILLOMA VIRUS 16 DNA: NEGATIVE
HUMAN PAPILLOMA VIRUS 18 DNA: NEGATIVE
HUMAN PAPILLOMA VIRUS FINAL DIAGNOSIS: NORMAL
HUMAN PAPILLOMA VIRUS OTHER HR: NEGATIVE

## 2022-03-04 ENCOUNTER — PRENATAL OFFICE VISIT (OUTPATIENT)
Dept: OBGYN | Facility: CLINIC | Age: 32
End: 2022-03-04
Payer: COMMERCIAL

## 2022-03-04 VITALS
WEIGHT: 140 LBS | SYSTOLIC BLOOD PRESSURE: 111 MMHG | BODY MASS INDEX: 22.26 KG/M2 | DIASTOLIC BLOOD PRESSURE: 74 MMHG | OXYGEN SATURATION: 98 % | HEART RATE: 78 BPM

## 2022-03-04 DIAGNOSIS — O09.00 SUPERVISION OF PREGNANCY WITH HISTORY OF INFERTILITY: Primary | ICD-10-CM

## 2022-03-04 DIAGNOSIS — E03.9 HYPOTHYROIDISM, UNSPECIFIED TYPE: ICD-10-CM

## 2022-03-04 PROCEDURE — 99207 PR PRENATAL VISIT: CPT | Performed by: NURSE PRACTITIONER

## 2022-03-04 NOTE — PROGRESS NOTES
Patient presents for routine prenatal visit. Prenatal flowsheet reviewed and updated as needed.  Denies vaginal bleeding, loss of fluid, contractions or cramping. Denies nausea/vomiting, upper abdominal pain, vision changes, lower extremity swelling, chest pain or shortness of breath.    Patient with complaint. Has been having headaches a few times/week. No vision changes. Usually notices after the work day. We discussed increasing fluids, Tylenol, heat/ice, caffeine.   Anticipatory guidance appropriate for gestational age reviewed.  PE: See OB vitals    Pregnancy complicated by:  Hypothyroid-check TSH on return to clinic.  IVF pregnancy    Routine prenatal care:  COVID-19 booster done  Screening ultrasound ordered  I discussed with the patient the option of maternal serum screening for chromosomal abnormalities (such as Trisomy 18 and 21) and neural tube defects.  We discussed the screening nature of this test and the potential for false negative and false positive results and the possible need for additional testing including Level 2 ultrasound and amniocentesis. She is given the opportunity to ask questions and have them answered. She declines testing for now, will let me know next visit if they change their mind.    Questions asked and answered. Next OB visit in 4 week(s) with OB Physician.    Vanessa LEE CNP

## 2022-03-11 ENCOUNTER — MYC MEDICAL ADVICE (OUTPATIENT)
Dept: OBGYN | Facility: CLINIC | Age: 32
End: 2022-03-11
Payer: COMMERCIAL

## 2022-03-11 ENCOUNTER — NURSE TRIAGE (OUTPATIENT)
Dept: NURSING | Facility: CLINIC | Age: 32
End: 2022-03-11
Payer: COMMERCIAL

## 2022-03-11 NOTE — TELEPHONE ENCOUNTER
"S-(situation): vomiting and diarrhea    B-(background):   Pt states she had buffalo turkey meatballs for dinner, then started vomiting at 9:30 PM    A-(assessment):   Vomited 4x, no blood, no bile in vomit  1 episode of diarrhea  Feels fatigued but can still stand and walk on her own    Has tried sips of water and saltine crackers but unable to keep it down    No dizziness    R-(recommendations): home care since pt is still hydrated. Continue sips of fluids/Gatorade or Pedialyte. Try to sleep and continue care advice for vomiting in AM. Pt should be better in 24 hours, if symptoms go beyond 24 hrs, call clinic.  Advised to call back for further concerns.    Does OB/GYn have further recommendations for pt?  Kindly call Lizabeth at [phone]    Komal Coloraod RN/[facility] Nurse Advisor        Reason for Disposition    [1] Unable to keep ANY liquids down (without vomiting) BUT [2] hydrated and present < 24 hours    [1] SEVERE vomiting (e.g., 8 or more times / day) BUT [2] hydrated and present < 8 hours    Additional Information    Negative: Sounds like a life-threatening emergency to the triager    Negative: [1] Vomiting AND [2] contains red blood or black (\"coffee ground\") material  (Exception: few red streaks in vomit that only happened once)    Negative: [1] Insulin-dependent diabetes (Type I) AND [2] glucose > 400 mg/dl (22 mmol/l)    Negative: Recent head injury (within last 3 days)    Negative: Recent abdominal injury (within last 3 days)    Negative: Severe pain in one eye    Negative: [1] SEVERE vomiting (e.g., 8 or more times / day) AND [2] present > 8 hours    Negative: [1] Drinking very little AND [2] dehydration suspected (e.g., no urine > 12 hours, very dry mouth, very lightheaded)    Negative: Patient sounds very sick or weak to the triager    Negative: [1] Unable to keep ANY liquids down (without vomiting) AND [2] present > 24 hours    Negative: Weight loss > 5 pounds (2.5 kg) in last 2 weeks    Negative: " Vomiting an essential/prescribed medication  (Exception: prenatal vitamins)    Negative: Recently started on a new medication    Negative: [1] MODERATE vomiting (e.g., 2-7 times / day) AND [2] present > 3 days    Negative: Alcohol or drug abuse, known or suspected    Negative: Pain or burning with passing urine (urination)    Negative: High-risk adult (e.g., diabetes, AIDS/HIV)    Negative: [1] MILD vomiting (e.g., 1-2 times / day) AND [2] present > 1 week AND [3] no improvement after using Morning Sickness Care Advice    Negative: MILD-MODERATE vomiting  (e.g., 1-7 times / day)or nausea    Protocols used: PREGNANCY - MORNING SICKNESS (NAUSEA AND VOMITING OF PREGNANCY)-A-AH

## 2022-03-11 NOTE — TELEPHONE ENCOUNTER
No further recommendations.  Just continue to rest and try to stay hydrated by taking small frequent sips of water, sports drink.  If still not able to keep any fluids down for 24 hours needs to be further evaluated in ED.    Sent pt a [employer] message.    Julieta Tyler RN

## 2022-03-13 ENCOUNTER — TELEPHONE (OUTPATIENT)
Dept: NURSING | Facility: CLINIC | Age: 32
End: 2022-03-13
Payer: COMMERCIAL

## 2022-03-13 NOTE — TELEPHONE ENCOUNTER
Telephone call    Patient calling because she is having heart burn.  She tried mylanta but threw up.  Went over meds for heart burn for pregnancy in one note.  She is going to try pepcid and if she does not get relief she will call us back.    Kathryn Sabillon RN   [facility] Nurse Advisor  10:25 AM 3/13/2022    COVID 19 Nurse Triage Plan/Patient Instructions    Please be aware that novel coronavirus (COVID-19) may be circulating in the community. If you develop symptoms such as fever, cough, or SOB or if you have concerns about the presence of another infection including coronavirus (COVID-19), please contact your health care provider or visit [email or web address].     Disposition/Instructions    Home care recommended. Follow home care protocol based instructions.    Thank you for taking steps to prevent the spread of this virus.  o Limit your contact with others.  o Wear a simple mask to cover your cough.  o Wash your hands well and often.    Resources    [facility]: About COVID-19: [email or web address]    CDC: What to Do If You're Sick: www.cdc.gov/coronavirus/2019-ncov/about/steps-when-sick.html    CDC: Ending Home Isolation: www.cdc.gov/coronavirus/2019-ncov/hcp/disposition-in-home-patients.html     CDC: Caring for Someone: www.cdc.gov/coronavirus/2019-ncov/if-you-are-sick/care-for-someone.html     [facility]: Interim Guidance for Hospital Discharge to Home: www.health.[facility].mn.us/diseases/coronavirus/hcp/hospdischarge.pdf    [facility] clinical trials (COVID-19 research studies): [email or web address]-clinical-[email or web address]     Below are the COVID-19 hotlines at the [facility] ([facility]). Interpreters are available.   o For health questions: Call 239-433-7733 or 1-183.849.2471 (7 a.m. to 7 p.m.)  o For questions about schools and childcare: Call 695-676-0842 or 1-681.769.7223 (7 a.m. to 7 p.m.)

## 2022-04-04 ENCOUNTER — PRENATAL OFFICE VISIT (OUTPATIENT)
Dept: OBGYN | Facility: CLINIC | Age: 32
End: 2022-04-04
Payer: COMMERCIAL

## 2022-04-04 VITALS
OXYGEN SATURATION: 98 % | SYSTOLIC BLOOD PRESSURE: 114 MMHG | DIASTOLIC BLOOD PRESSURE: 74 MMHG | BODY MASS INDEX: 23.85 KG/M2 | HEART RATE: 83 BPM | WEIGHT: 150 LBS

## 2022-04-04 DIAGNOSIS — O09.00 SUPERVISION OF PREGNANCY WITH HISTORY OF INFERTILITY: Primary | ICD-10-CM

## 2022-04-04 DIAGNOSIS — E03.9 HYPOTHYROIDISM, UNSPECIFIED TYPE: ICD-10-CM

## 2022-04-04 PROCEDURE — 99207 PR PRENATAL VISIT: CPT | Performed by: NURSE PRACTITIONER

## 2022-04-04 PROCEDURE — 84443 ASSAY THYROID STIM HORMONE: CPT | Performed by: NURSE PRACTITIONER

## 2022-04-04 PROCEDURE — 36415 COLL VENOUS BLD VENIPUNCTURE: CPT | Performed by: NURSE PRACTITIONER

## 2022-04-04 NOTE — PROGRESS NOTES
Patient presents for routine prenatal visit. Prenatal flowsheet reviewed and updated as needed.  Denies vaginal bleeding, loss of fluid, contractions or cramping.     Anticipatory guidance appropriate for gestational age reviewed.  PE: See OB vitals    Pregnancy complicated by:  Hypothyroidism-check TSH today  IVF pregnancy-screening ultrasound scheduled for tomorrow-declines level 2/ECHO at this time-discussed recommendation and rationale given her IVF pregnancy, reviewed risks. To let me know if she changes her mind.    Routine prenatal care:  GTT, HGB, antibody screen after 24 weeks.  Declines MSAFP.  COVID-19 booster done.    Questions asked and answered. Next OB visit in 4 week(s) with OB Physician.    Vanessa LEE CNP

## 2022-04-05 ENCOUNTER — ANCILLARY PROCEDURE (OUTPATIENT)
Dept: ULTRASOUND IMAGING | Facility: CLINIC | Age: 32
End: 2022-04-05
Attending: NURSE PRACTITIONER
Payer: COMMERCIAL

## 2022-04-05 DIAGNOSIS — E03.9 HYPOTHYROIDISM, UNSPECIFIED TYPE: Primary | ICD-10-CM

## 2022-04-05 DIAGNOSIS — O09.00 SUPERVISION OF PREGNANCY WITH HISTORY OF INFERTILITY: ICD-10-CM

## 2022-04-05 DIAGNOSIS — E03.9 HYPOTHYROIDISM, UNSPECIFIED TYPE: ICD-10-CM

## 2022-04-05 LAB — TSH SERPL DL<=0.005 MIU/L-ACNC: 3.32 MU/L (ref 0.4–4)

## 2022-04-05 PROCEDURE — 76805 OB US >/= 14 WKS SNGL FETUS: CPT | Performed by: RADIOLOGY

## 2022-04-05 RX ORDER — LEVOTHYROXINE SODIUM 125 UG/1
TABLET ORAL
OUTPATIENT
Start: 2022-04-05

## 2022-04-05 RX ORDER — LEVOTHYROXINE SODIUM 125 UG/1
125 TABLET ORAL DAILY
Qty: 30 TABLET | Refills: 2 | Status: SHIPPED | OUTPATIENT
Start: 2022-04-05 | End: 2022-05-03

## 2022-04-05 NOTE — TELEPHONE ENCOUNTER
"Requested Prescriptions   Pending Prescriptions Disp Refills     levothyroxine (SYNTHROID/LEVOTHROID) 125 MCG tablet [Pharmacy Med Name: LEVOTHYROXINE 0.125MG (125MCG) TAB] 90 tablet      Sig: TAKE 1 TABLET(125 MCG) BY MOUTH DAILY       Thyroid Protocol Failed - 4/5/2022  9:25 AM        Failed - No active pregnancy on record     If patient is pregnant or has had a positive pregnancy test, please check TSH.          Passed - Patient is 12 years or older        Passed - Recent (12 mo) or future (30 days) visit within the authorizing provider's specialty     Patient has had an office visit with the authorizing provider or a provider within the authorizing providers department within the previous 12 mos or has a future within next 30 days. See \"Patient Info\" tab in inbasket, or \"Choose Columns\" in Meds & Orders section of the refill encounter.              Passed - Medication is active on med list        Passed - Normal TSH on file in past 12 months     Recent Labs   Lab Test 04/04/22  1702   TSH 3.32              Passed - No positive pregnancy test in past 12 months     If patient is pregnant or has had a positive pregnancy test, please check TSH.             Last seen on 4/4/22 for prenatal care.  TSH checked 4/4/22. Value: 3.32    Per provider result note:  Your thyroid function came back at 3.32. While this is technically in the normal range, it is higher than we like it to be in the second trimester of pregnancy. I have sent a new prescription to your pharmacy with a dose increase to 125 mcg daily. Please start this new dose now and we will plan to recheck your thyroid level at your next visit.  Pt had not yet read message.    RN spoke w/pt on phone to relay message regarding providers message.    Routing this order to provider to review/replace as appropriate.    VASILE VillasenorN RN    "

## 2022-04-05 NOTE — TELEPHONE ENCOUNTER
Message sent to patient. Can provider or RN refuse medication for now? Can not close encounter until medication is deleted/refused.

## 2022-04-05 NOTE — TELEPHONE ENCOUNTER
Looks like patient is requesting a 90 day supply-I would like to recheck her TSH in 4 weeks and if it still is abnormal, would need another dose adjustment-which is why for now I did a 30 day supply. If it is normal in 4 weeks, will then send in 90 day supply. Vanessa LEE CNP

## 2022-04-06 PROBLEM — O44.42 LOW-LYING PLACENTA IN SECOND TRIMESTER: Status: ACTIVE | Noted: 2022-04-06

## 2022-05-02 ENCOUNTER — PRENATAL OFFICE VISIT (OUTPATIENT)
Dept: OBGYN | Facility: CLINIC | Age: 32
End: 2022-05-02
Payer: COMMERCIAL

## 2022-05-02 VITALS
HEIGHT: 67 IN | OXYGEN SATURATION: 98 % | WEIGHT: 158.4 LBS | SYSTOLIC BLOOD PRESSURE: 114 MMHG | HEART RATE: 83 BPM | BODY MASS INDEX: 24.86 KG/M2 | DIASTOLIC BLOOD PRESSURE: 73 MMHG

## 2022-05-02 DIAGNOSIS — O44.42 LOW-LYING PLACENTA IN SECOND TRIMESTER: ICD-10-CM

## 2022-05-02 DIAGNOSIS — E03.9 HYPOTHYROIDISM, UNSPECIFIED TYPE: ICD-10-CM

## 2022-05-02 DIAGNOSIS — O09.00 SUPERVISION OF PREGNANCY WITH HISTORY OF INFERTILITY: Primary | ICD-10-CM

## 2022-05-02 LAB — TSH SERPL DL<=0.005 MIU/L-ACNC: 1.48 MU/L (ref 0.4–4)

## 2022-05-02 PROCEDURE — 84443 ASSAY THYROID STIM HORMONE: CPT | Performed by: NURSE PRACTITIONER

## 2022-05-02 PROCEDURE — 36415 COLL VENOUS BLD VENIPUNCTURE: CPT | Performed by: NURSE PRACTITIONER

## 2022-05-02 PROCEDURE — 99207 PR PRENATAL VISIT: CPT | Performed by: NURSE PRACTITIONER

## 2022-05-02 ASSESSMENT — PAIN SCALES - GENERAL: PAINLEVEL: NO PAIN (0)

## 2022-05-02 NOTE — PROGRESS NOTES
Patient presents for routine prenatal visit. Prenatal flowsheet reviewed and updated as needed.  Denies vaginal bleeding, loss of fluid, contractions or cramping. Denies headache, nausea/vomiting, upper abdominal pain, vision changes, lower extremity swelling, chest pain or shortness of breath.     Anticipatory guidance appropriate for gestational age reviewed.  PE: See OB vitals    Pregnancy complicated by:  Hypothyroidism-dose change last month. Check TSH today.  IVF pregnancy-level 2 ultrasound was declined, routine screening ultrasound showed low lying placenta 2 cm from cervical os-will plan follow up ultrasound at 28 weeks.    Routine prenatal care:  Labs on return to clinic.    Questions asked and answered. Next OB visit in 4 week(s) with OB Physician.    Vanessa LEE CNP

## 2022-05-02 NOTE — PATIENT INSTRUCTIONS
If you have any questions regarding your visit, Please contact your care team.     [employer]: [phone]  To Schedule an Appointment 24/7  Call: [phone] [facility] HOURS TELEPHONE NUMBER     Vanessa Caldwell-Medical Assistant    Katarina-RN  Julieta-RN  [place]-  Tiki-         Monday 7:30 am-5:00 pm    Tuesday 8:00 am-4:00 pm    Wednesday 7:30 am-4:00 pm  [place]    Thursday 8:00 am-11:00 am    Friday 7:30 am-4:00 pm [facility]  [address] [place] [address] [phone] ask for [facility]  [phone] Fax    Imaging Scheduling all locations  [phone]     [facility] Labor and Delivery  [address] [place] [address]   [place], [place] [id number] [phone]         Urgent Care locations:  [facility]   Monday-Friday  10 am - 8 pm  Saturday and Sunday   9 am - 5 pm     [phone]   If you need a medication refill, please contact your pharmacy. Please allow 3 business days for your refill to be completed.  As always, Thank you for trusting us with your healthcare needs!      see additional instructions from your care team below

## 2022-05-03 RX ORDER — LEVOTHYROXINE SODIUM 125 UG/1
125 TABLET ORAL DAILY
Qty: 90 TABLET | Refills: 1 | Status: SHIPPED | OUTPATIENT
Start: 2022-05-03 | End: 2022-09-28 | Stop reason: DRUGHIGH

## 2022-06-03 ENCOUNTER — PRENATAL OFFICE VISIT (OUTPATIENT)
Dept: OBGYN | Facility: CLINIC | Age: 32
End: 2022-06-03
Payer: COMMERCIAL

## 2022-06-03 VITALS
HEART RATE: 89 BPM | OXYGEN SATURATION: 98 % | DIASTOLIC BLOOD PRESSURE: 75 MMHG | WEIGHT: 162.6 LBS | HEIGHT: 67 IN | SYSTOLIC BLOOD PRESSURE: 115 MMHG | BODY MASS INDEX: 25.52 KG/M2

## 2022-06-03 DIAGNOSIS — O09.00 SUPERVISION OF PREGNANCY WITH HISTORY OF INFERTILITY: Primary | ICD-10-CM

## 2022-06-03 DIAGNOSIS — Z67.91 RH NEGATIVE STATE IN ANTEPARTUM PERIOD: ICD-10-CM

## 2022-06-03 DIAGNOSIS — O26.899 RH NEGATIVE STATE IN ANTEPARTUM PERIOD: ICD-10-CM

## 2022-06-03 DIAGNOSIS — O44.42 LOW-LYING PLACENTA IN SECOND TRIMESTER: ICD-10-CM

## 2022-06-03 LAB
ABO/RH(D): NORMAL
ANTIBODY SCREEN: NEGATIVE
GLUCOSE 1H P 50 G GLC PO SERPL-MCNC: 106 MG/DL (ref 70–129)
HGB BLD-MCNC: 12.2 G/DL (ref 11.7–15.7)
SPECIMEN EXPIRATION DATE: NORMAL

## 2022-06-03 PROCEDURE — 99207 PR PRENATAL VISIT: CPT | Performed by: NURSE PRACTITIONER

## 2022-06-03 PROCEDURE — 86850 RBC ANTIBODY SCREEN: CPT | Performed by: NURSE PRACTITIONER

## 2022-06-03 PROCEDURE — 36415 COLL VENOUS BLD VENIPUNCTURE: CPT | Performed by: NURSE PRACTITIONER

## 2022-06-03 PROCEDURE — 85018 HEMOGLOBIN: CPT | Performed by: NURSE PRACTITIONER

## 2022-06-03 PROCEDURE — 86900 BLOOD TYPING SEROLOGIC ABO: CPT | Performed by: NURSE PRACTITIONER

## 2022-06-03 PROCEDURE — 86901 BLOOD TYPING SEROLOGIC RH(D): CPT | Performed by: NURSE PRACTITIONER

## 2022-06-03 PROCEDURE — 96372 THER/PROPH/DIAG INJ SC/IM: CPT | Performed by: NURSE PRACTITIONER

## 2022-06-03 PROCEDURE — 82950 GLUCOSE TEST: CPT | Performed by: NURSE PRACTITIONER

## 2022-06-03 ASSESSMENT — PAIN SCALES - GENERAL: PAINLEVEL: NO PAIN (0)

## 2022-06-03 NOTE — PROGRESS NOTES
Clinic Administered Medication Documentation    Administrations This Visit     rho(D) immune globulin (RHOGAM) injection 300 mcg     Admin Date  06/03/2022 Action  Given Dose  300 mcg Route  Intramuscular Site  Right Gluteus Mike Administered By  Paulette Jean CMA    Ordering Provider: Vanessa Zavala APRN CNP    Patient Supplied?: No                  Injectable Medication Documentation    Patient was given Rhogam. Prior to medication administration, verified patients identity using patient s name and date of birth. Please see MAR and medication order for additional information. Patient instructed to remain in clinic for 15 minutes and report any adverse reaction to staff immediately .      Was entire vial of medication used? Yes  Vial/Syringe: Syringe  Expiration Date:  10/15/2023  Was this medication supplied by the patient? No

## 2022-06-03 NOTE — PATIENT INSTRUCTIONS
If you have any questions regarding your visit, Please contact your care team.     [facility]: [phone]  To Schedule an Appointment 24/7  Call: [phone] HOURS TELEPHONE NUMBER     Vanessa Zavala- APRN CNP      Esvin Bray-URMILA Rendon-RN  [place]-Surgery Scheduler  Tiki-Surgery Scheduler         Monday 7:30 am-5:00 pm    Tuesday 8:00 am-4:00 pm    Wednesday 7:30 am-4:00 pm  [place]    Thursday 8:00 am-11:00 am    Friday 7:30 am-4:00 pm [address] [place] [address] [phone] ask for Women's [facility]  [phone] Fax    Imaging Scheduling all locations  [phone]     [facility] Labor and Delivery  [address] [place] [address]   [place], [place] [id number] [phone]         Urgent Care locations:  [facility]   Monday-Friday  10 am - 8 pm  Saturday and Sunday   9 am - 5 pm     [phone]   If you need a medication refill, please contact your pharmacy. Please allow 3 business days for your refill to be completed.  As always, Thank you for trusting us with your healthcare needs!      see additional instructions from your care team below

## 2022-06-03 NOTE — PROGRESS NOTES
Patient presents for routine prenatal visit. Prenatal flowsheet reviewed and updated as needed.  Denies vaginal bleeding, loss of fluid, contractions or cramping. Denies headache, nausea/vomiting, upper abdominal pain, vision changes, lower extremity swelling, chest pain or shortness of breath.     Anticipatory guidance appropriate for gestational age reviewed.  PE: See OB vitals    Pregnancy complicated by:  Hypothyroidism-TSH in range, recheck 3rd trimester.  IVF pregnancy-previously declined level 2/ECHO  Low lying placenta-needs to schedule follow up ultrasound.     Routine prenatal care:  Labs today.  Rhogam given today  Would like to do Tdap on return to clinic.    Questions asked and answered. Next OB visit in 2 week(s) - aware she needs to see OB Physician.    Vanessa LEE CNP

## 2022-06-07 ENCOUNTER — ANCILLARY PROCEDURE (OUTPATIENT)
Dept: ULTRASOUND IMAGING | Facility: CLINIC | Age: 32
End: 2022-06-07
Attending: NURSE PRACTITIONER
Payer: COMMERCIAL

## 2022-06-07 DIAGNOSIS — O44.42 LOW-LYING PLACENTA IN SECOND TRIMESTER: ICD-10-CM

## 2022-06-07 PROCEDURE — 76815 OB US LIMITED FETUS(S): CPT | Mod: TC | Performed by: RADIOLOGY

## 2022-06-08 PROBLEM — O44.42 LOW-LYING PLACENTA IN SECOND TRIMESTER: Status: RESOLVED | Noted: 2022-04-06 | Resolved: 2022-06-08

## 2022-06-20 ENCOUNTER — MYC MEDICAL ADVICE (OUTPATIENT)
Dept: OBGYN | Facility: CLINIC | Age: 32
End: 2022-06-20
Payer: COMMERCIAL

## 2022-06-20 ENCOUNTER — PRENATAL OFFICE VISIT (OUTPATIENT)
Dept: OBGYN | Facility: CLINIC | Age: 32
End: 2022-06-20
Payer: COMMERCIAL

## 2022-06-20 VITALS
DIASTOLIC BLOOD PRESSURE: 69 MMHG | HEART RATE: 80 BPM | WEIGHT: 165.4 LBS | HEIGHT: 67 IN | BODY MASS INDEX: 25.96 KG/M2 | SYSTOLIC BLOOD PRESSURE: 112 MMHG | OXYGEN SATURATION: 100 %

## 2022-06-20 DIAGNOSIS — Z23 NEED FOR TDAP VACCINATION: ICD-10-CM

## 2022-06-20 DIAGNOSIS — O09.813 PREGNANCY RESULTING FROM IN VITRO FERTILIZATION IN THIRD TRIMESTER: ICD-10-CM

## 2022-06-20 DIAGNOSIS — O09.00 SUPERVISION OF PREGNANCY WITH HISTORY OF INFERTILITY: Primary | ICD-10-CM

## 2022-06-20 PROCEDURE — 99207 PR PRENATAL VISIT: CPT | Performed by: NURSE PRACTITIONER

## 2022-06-20 PROCEDURE — 90471 IMMUNIZATION ADMIN: CPT | Performed by: NURSE PRACTITIONER

## 2022-06-20 PROCEDURE — 90715 TDAP VACCINE 7 YRS/> IM: CPT | Performed by: NURSE PRACTITIONER

## 2022-06-20 ASSESSMENT — PAIN SCALES - GENERAL: PAINLEVEL: NO PAIN (0)

## 2022-06-20 NOTE — PROGRESS NOTES
Prior to immunization administration, verified patients identity using patient s name and date of birth. Please see Immunization Activity for additional information.     Screening Questionnaire for Adult Immunization    Are you sick today?   No   Do you have allergies to medications, food, a vaccine component or latex?   No   Have you ever had a serious reaction after receiving a vaccination?   No   Do you have a long-term health problem with heart, lung, kidney, or metabolic disease (e.g., diabetes), asthma, a blood disorder, no spleen, complement component deficiency, a cochlear implant, or a spinal fluid leak?  Are you on long-term aspirin therapy?   Yes   Do you have cancer, leukemia, HIV/AIDS, or any other immune system problem?   No   Do you have a parent, brother, or sister with an immune system problem?   Yes   In the past 3 months, have you taken medications that affect  your immune system, such as prednisone, other steroids, or anticancer drugs; drugs for the treatment of rheumatoid arthritis, Crohn s disease, or psoriasis; or have you had radiation treatments?   No   Have you had a seizure, or a brain or other nervous system problem?   No   During the past year, have you received a transfusion of blood or blood    products, or been given immune (gamma) globulin or antiviral drug?   No   For women: Are you pregnant or is there a chance you could become       pregnant during the next month?   Yes   Have you received any vaccinations in the past 4 weeks?   No     Immunization questionnaire was positive for at least one answer.  Notified Vanessa LEE CNP.        Per orders of Vanessa LEE CNP, injection of Tdap given by Paulette Jean CMA. Patient instructed to remain in clinic for 15 minutes afterwards, and to report any adverse reaction to me immediately.       Screening performed by Paulette Jean CMA on 6/20/2022 at 4:36 PM.

## 2022-06-20 NOTE — PATIENT INSTRUCTIONS
If you have any questions regarding your visit, Please contact your care team.     [facility]: [phone]  To Schedule an Appointment 24/7  Call: [phone]'s [facility] Hospital HOURS TELEPHONE NUMBER     Vanessa Zavala- APRN CNP      Esvin Bray-URMILA Rendon-RN  [place]-Surgery Scheduler  Tiki-Surgery Scheduler         Monday 7:30 am-5:00 pm    Tuesday 8:00 am-4:00 pm    Wednesday 7:30 am-4:00 pm  [place]    Thursday 8:00 am-11:00 am    Friday 7:30 am-4:00 pm [address] [place] [address] [phone] ask for [facility]  [phone] Fax    Imaging Scheduling all locations  [phone]     [facility] Labor and Delivery  [address] [place] [address]   [place], [place] [id number] [phone]         Urgent Care locations:  [facility]   Monday-Friday  10 am - 8 pm  Saturday and Sunday   9 am - 5 pm     [phone]   If you need a medication refill, please contact your pharmacy. Please allow 3 business days for your refill to be completed.  As always, Thank you for trusting us with your healthcare needs!      see additional instructions from your care team below

## 2022-06-20 NOTE — PROGRESS NOTES
Patient presents for routine prenatal visit. Prenatal flowsheet reviewed and updated as needed.  Denies vaginal bleeding, loss of fluid, contractions or cramping. Denies headache, nausea/vomiting, upper abdominal pain, vision changes, lower extremity swelling, chest pain or shortness of breath.     Anticipatory guidance appropriate for gestational age reviewed.  PE: See OB vitals    Pregnancy complicated by:  IVF pregnancy-consider growth ultrasound 36 weeks. Previously declined level 2 ultrasound/ECHO-now amenable. Will refer.   Hypothyroidism-check TSH on return to clinic.    Routine prenatal care:  Breast pump prescription faxed per request.   Tdap given.    Questions asked and answered. Next OB visit in 2 week(s) with OB Physician.    Vanessa LEE CNP

## 2022-06-23 ENCOUNTER — MYC MEDICAL ADVICE (OUTPATIENT)
Dept: OBGYN | Facility: CLINIC | Age: 32
End: 2022-06-23

## 2022-06-30 ENCOUNTER — PRENATAL OFFICE VISIT (OUTPATIENT)
Dept: OBGYN | Facility: CLINIC | Age: 32
End: 2022-06-30
Payer: COMMERCIAL

## 2022-06-30 VITALS
HEART RATE: 85 BPM | OXYGEN SATURATION: 99 % | BODY MASS INDEX: 26.9 KG/M2 | SYSTOLIC BLOOD PRESSURE: 118 MMHG | DIASTOLIC BLOOD PRESSURE: 76 MMHG | WEIGHT: 169.2 LBS

## 2022-06-30 DIAGNOSIS — E03.9 HYPOTHYROIDISM, UNSPECIFIED TYPE: ICD-10-CM

## 2022-06-30 DIAGNOSIS — O09.00 SUPERVISION OF PREGNANCY WITH HISTORY OF INFERTILITY: Primary | ICD-10-CM

## 2022-06-30 LAB — TSH SERPL DL<=0.005 MIU/L-ACNC: 0.9 MU/L (ref 0.4–4)

## 2022-06-30 PROCEDURE — 36415 COLL VENOUS BLD VENIPUNCTURE: CPT | Performed by: OBSTETRICS & GYNECOLOGY

## 2022-06-30 PROCEDURE — 99207 PR PRENATAL VISIT: CPT | Performed by: OBSTETRICS & GYNECOLOGY

## 2022-06-30 PROCEDURE — 84443 ASSAY THYROID STIM HORMONE: CPT | Performed by: OBSTETRICS & GYNECOLOGY

## 2022-06-30 NOTE — NURSING NOTE
MFM referral Faxed to [facility].    Josseline Lawrence, [facility]  June 30, 2022

## 2022-06-30 NOTE — PATIENT INSTRUCTIONS
If you have any questions regarding your visit, Please contact your care team.     [employer]: [phone]    To Schedule an Appointment 24/7  Call: [phone] [facility] Medical Center HOURS TELEPHONE NUMBER     Man Navarro MD  Medical Director    Aaron Rendon-RN  [place]-Surgery Scheduler  Tiki-Surgery Scheduler               Tuesday-[place]  7:30 a.m-4:30 p.m    Thursday-[place]  7:30 a.m-4:30 p.m    Typical Surgery Days: Tuesday or Friday [facility] [place]  [address] [place] [address]  PH: [phone]     Imaging Scheduling all locations  PH: [phone]     [facility] Labor and Delivery  [address] [place] [address] [place] [address]  PH: [phone]    [facility]  [address] [place] [address]  PH: [phone] Fax      **Surgeries** Our Surgery Schedulers will contact you to schedule. If you do not receive a call within 3 business days, please call [phone].    Urgent Care locations:  [facility] Monday-Friday  10 am - 8 pm  Saturday and Sunday   9 am - 5 pm  Monday-Friday   10 am - 8 pm  Saturday and Sunday   9 am - 5 pm   [phone]   If you need a medication refill, please contact your pharmacy. Please allow 3 business days for your refill to be completed.  As always, Thank you for trusting us with your healthcare needs!    see additional instructions from your care team below

## 2022-06-30 NOTE — PROGRESS NOTES
Patient presents for routine prenatal visit at 31w6d.  Patient with complaint. Having some GERD.  Will start an H2 blocker  PE: See OB vitals  Body mass index is 26.9 kg/m .    No vaginal bleeding, loss of fluid, or contractions  Now requesting fetal cardiac evaluation due to this being an IVF pregnancy.  She had previously declined the option, but has changed her mind.  Needs TSH re-check  Questions asked and answered.      Man Navarro MD FACOG

## 2022-07-05 ENCOUNTER — TRANSFERRED RECORDS (OUTPATIENT)
Dept: HEALTH INFORMATION MANAGEMENT | Facility: CLINIC | Age: 32
End: 2022-07-05

## 2022-07-13 ENCOUNTER — PRENATAL OFFICE VISIT (OUTPATIENT)
Dept: OBGYN | Facility: CLINIC | Age: 32
End: 2022-07-13
Payer: COMMERCIAL

## 2022-07-13 VITALS
SYSTOLIC BLOOD PRESSURE: 115 MMHG | OXYGEN SATURATION: 98 % | HEART RATE: 85 BPM | BODY MASS INDEX: 27.5 KG/M2 | WEIGHT: 173 LBS | DIASTOLIC BLOOD PRESSURE: 73 MMHG

## 2022-07-13 DIAGNOSIS — E03.9 HYPOTHYROIDISM, UNSPECIFIED TYPE: ICD-10-CM

## 2022-07-13 DIAGNOSIS — O09.00 SUPERVISION OF PREGNANCY WITH HISTORY OF INFERTILITY: Primary | ICD-10-CM

## 2022-07-13 DIAGNOSIS — O09.813 PREGNANCY RESULTING FROM IN VITRO FERTILIZATION IN THIRD TRIMESTER: ICD-10-CM

## 2022-07-13 PROCEDURE — 99207 PR PRENATAL VISIT: CPT | Performed by: OBSTETRICS & GYNECOLOGY

## 2022-07-13 ASSESSMENT — PAIN SCALES - GENERAL: PAINLEVEL: NO PAIN (0)

## 2022-07-13 NOTE — PROGRESS NOTES
Presents for routine  appointment.    Blurry vision, history of blurry vision prior to pregnancy. Unable to see further away, not sure if needs new rx for glasses/contacts. No headaches, scotomas, upper abdominal pain     No complaints.    No LOF/VB/Ctxs.  +FM  ROS:   and GI  negative.     /73   Pulse 85   Wt 78.5 kg (173 lb)   SpO2 98%   BMI 27.50 kg/m        A/P:  31 year old  at 33w5d RAMO      Pregnancy Complications:  - IVF pregnancy, level II at 30wks. Weekly BPP at 36wks  - Hypothyroid, on synthroid. Last TSH 0.9   -Rh neg s/p rhogam  -Blurry vision without s/s pre-e. BP stable. Recommend further evaluation with ophthalmology this week. Red flag symptoms reviewed, patient in agreement with plan     Routine Prenatal Care:  -Signs and symptoms of  labor discussed as well as fetal kick counts  -Group B Strep at 36+ weeks   -Patient to bring birth plan to next visit  -Peds: Undecided, ?[facility]  -Contraception: Declines    Follow up in 2 weeks.    Gertrudis Chang, DO

## 2022-07-19 ENCOUNTER — TELEPHONE (OUTPATIENT)
Dept: OBGYN | Facility: CLINIC | Age: 32
End: 2022-07-19

## 2022-07-19 NOTE — TELEPHONE ENCOUNTER
[facility]    Phone Message    May a detailed message be left on voicemail: yes     Reason for Call: Other: MFM called in regards to the orders for the Echo Fetal Complete.  This was placed as an order versus referral so it wasn't getting addressed properly by MFM, they just happened to get a call from the pt about scheduling.  They can still do this, but their soonest date for scheduling is 8/23/22, pt is due on 8/26/22.  Please review and advise if they should proceed with doing this, they did state per notes from 6/30/22 chart notes the pt had previously declined doing this testing.  Please call to discuss if pt should still be scheduled.     Action Taken: Message routed to:  [phone]    Travel Screening: Not Applicable

## 2022-07-20 ENCOUNTER — TELEPHONE (OUTPATIENT)
Dept: OPTOMETRY | Facility: CLINIC | Age: 32
End: 2022-07-20

## 2022-07-20 ENCOUNTER — OFFICE VISIT (OUTPATIENT)
Dept: OPTOMETRY | Facility: CLINIC | Age: 32
End: 2022-07-20
Payer: COMMERCIAL

## 2022-07-20 DIAGNOSIS — H52.223 REGULAR ASTIGMATISM OF BOTH EYES: ICD-10-CM

## 2022-07-20 DIAGNOSIS — Z01.00 ROUTINE EYE EXAM: Primary | ICD-10-CM

## 2022-07-20 DIAGNOSIS — H52.13 MYOPIA OF BOTH EYES: ICD-10-CM

## 2022-07-20 PROCEDURE — 92015 DETERMINE REFRACTIVE STATE: CPT | Performed by: OPTOMETRIST

## 2022-07-20 PROCEDURE — 92004 COMPRE OPH EXAM NEW PT 1/>: CPT | Performed by: OPTOMETRIST

## 2022-07-20 PROCEDURE — 92310 CONTACT LENS FITTING OU: CPT | Mod: GA | Performed by: OPTOMETRIST

## 2022-07-20 ASSESSMENT — REFRACTION_WEARINGRX
OS_AXIS: 070
OS_SPHERE: -1.25
OD_AXIS: 085
SPECS_TYPE: SVL
OD_SPHERE: -1.00
OS_CYLINDER: +0.50
OD_CYLINDER: +0.50

## 2022-07-20 ASSESSMENT — VISUAL ACUITY
OS_SC: 20/20
CORRECTION_TYPE: GLASSES
OD_CC: 20/20
METHOD: SNELLEN - LINEAR
OD_SC: 20/20
OS_CC: 20/20

## 2022-07-20 ASSESSMENT — REFRACTION_MANIFEST
METHOD_AUTOREFRACTION: 1
OS_AXIS: 057
OD_CYLINDER: +0.75
OD_SPHERE: -1.00
OS_CYLINDER: +0.50
OD_SPHERE: -1.00
OD_AXIS: 106
OS_SPHERE: -0.75
OS_CYLINDER: +0.50
OD_AXIS: 105
OS_SPHERE: -1.00
OS_AXIS: 050
OD_CYLINDER: +0.50

## 2022-07-20 ASSESSMENT — REFRACTION_CURRENTRX
OS_AXIS: 140
OS_CYLINDER: -0.75
OD_BRAND: AV OASYS
OS_BRAND: J&J ACUVUE OASYS FOR ASTIGMATISM BC 8.6, D 14.5
OD_AXIS: 010
OS_SPHERE: -0.25
OD_SPHERE: -1.00
OD_BRAND: J&J ACUVUE OASYS FOR ASTIGMATISM BC 8.6, D 14.5
OD_SPHERE: -0.50
OS_BRAND: AV OASYS
OS_SPHERE: -0.75
OD_CYLINDER: -0.75

## 2022-07-20 ASSESSMENT — SLIT LAMP EXAM - LIDS
COMMENTS: NORMAL
COMMENTS: NORMAL

## 2022-07-20 ASSESSMENT — CONF VISUAL FIELD
METHOD: COUNTING FINGERS
OD_NORMAL: 1
OS_NORMAL: 1

## 2022-07-20 ASSESSMENT — CUP TO DISC RATIO
OD_RATIO: 0.4
OS_RATIO: 0.4

## 2022-07-20 ASSESSMENT — EXTERNAL EXAM - RIGHT EYE: OD_EXAM: NORMAL

## 2022-07-20 ASSESSMENT — KERATOMETRY
OS_K2POWER_DIOPTERS: 44.75
OD_AXISANGLE2_DEGREES: 10
OD_K2POWER_DIOPTERS: 44.75
OS_AXISANGLE2_DEGREES: 161
OD_K1POWER_DIOPTERS: 43.75
OS_K1POWER_DIOPTERS: 44.00

## 2022-07-20 ASSESSMENT — EXTERNAL EXAM - LEFT EYE: OS_EXAM: NORMAL

## 2022-07-20 NOTE — PROGRESS NOTES
Chief Complaint   Patient presents with     COMPREHENSIVE EYE EXAM     Contact Lens Re-fitting        Previous contact lens wearer? Yes: wears AV Oasys   Comfort of contact lenses :Good   Satisfied with current lenses: Yes        Last Eye Exam: 2-3 years ago, not at [facility]   Dilated Previously: Yes. Might want to skip the dilation, she's worried about driving home    What are you currently using to see?  glasses and contacts    Distance Vision Acuity: Noticed gradual change in both eyes, things have gotten blurry     Near Vision Acuity: Satisfied with vision while reading and using computer unaided    Eye Comfort: good. Her doctor told her to get eyes checked due to the blurry vision  Do you use eye drops? : No  Occupation or Hobbies: Teacher       Gayatri Coronado Optometric Assistant      Medical, surgical and family histories reviewed and updated 7/20/2022.       OBJECTIVE: See Ophthalmology exam    ASSESSMENT:    ICD-10-CM    1. Routine eye exam  Z01.00    2. Myopia of both eyes  H52.13    3. Regular astigmatism of both eyes  H52.223       PLAN:     There are no Patient Instructions on file for this visit.

## 2022-07-20 NOTE — TELEPHONE ENCOUNTER
7/20/2022     BRAND BC MOE POWER ADD QTY   OD AV Oasys for Astig 8.6 14.5 -0.50 -0.75 x010  trials   OS    -0.25 -0.75 x140       Needs a CL Dispense Appointment, Do not call for .    REF:    Clinic visit    Payment Type: no charge     Gayatri Coronado Optometric Assistant       CL IN (date, intials):    7/27/22/tra       Patient Notified (date, initals):  I called and LM that lenses are in and that she needs to schedule a CL DISPENSE appt.      Dispensed by: Needs a dispense appointment

## 2022-07-20 NOTE — TELEPHONE ENCOUNTER
Gertrudis Chang, DO  [facility] Triage 2 hours ago (7:08 AM)     KK    Looking back at Dr. Navarro notes, it appears the patient changed her mind regarding fetal echo and that was when the order may have been placed. Please confirm with patient, including time limitations given late gestation, and contact MFM with an update.      Attempted to reach pt by phone to ask if she is interested in getting a fetal echo done.  Unable to reach patient via phone. Left message to call back at [phone].    Julieta Tyler RN

## 2022-07-20 NOTE — PATIENT INSTRUCTIONS
Fill glasses prescription  Contact trials on order  We will contact you to schedule a contact dispense appointment to get the trial lenses once they arrive  Return in 1 year for eye exam    Catarina Whitney, OD  [phone]

## 2022-07-20 NOTE — LETTER
7/20/2022         RE: Lizabeth Dye  [address]  [place] [address]        Dear Colleague,    Thank you for referring your patient, Lizabeth Dye, to the [facility]. Please see a copy of my visit note below.    Chief Complaint   Patient presents with     COMPREHENSIVE EYE EXAM     Contact Lens Re-fitting        Previous contact lens wearer? Yes: wears AV Oasys   Comfort of contact lenses :Good   Satisfied with current lenses: Yes        Last Eye Exam: 2-3 years ago, not at [employer]   Dilated Previously: Yes. Might want to skip the dilation, she's worried about driving home    What are you currently using to see?  glasses and contacts    Distance Vision Acuity: Noticed gradual change in both eyes, things have gotten blurry     Near Vision Acuity: Satisfied with vision while reading and using computer unaided    Eye Comfort: good. Her doctor told her to get eyes checked due to the blurry vision  Do you use eye drops? : No  Occupation or Hobbies: Teacher       Gayatri Coronado Optometric Assistant      Medical, surgical and family histories reviewed and updated 7/20/2022.       OBJECTIVE: See Ophthalmology exam    ASSESSMENT:    ICD-10-CM    1. Routine eye exam  Z01.00    2. Myopia of both eyes  H52.13    3. Regular astigmatism of both eyes  H52.223       PLAN:     There are no Patient Instructions on file for this visit.                       Again, thank you for allowing me to participate in the care of your patient.        Sincerely,        Catarina Whitney, OD

## 2022-07-21 ENCOUNTER — MYC MEDICAL ADVICE (OUTPATIENT)
Dept: OBGYN | Facility: CLINIC | Age: 32
End: 2022-07-21

## 2022-07-21 NOTE — TELEPHONE ENCOUNTER
Sent pt a [employer] message asking if she is wanting a fetal echo done.    Julieta Tyler RN

## 2022-07-22 NOTE — TELEPHONE ENCOUNTER
Pt has not read the mychart sent to her yet.  Attempted to reach by phone again as well.  Unable to reach patient via phone. Left message to call back at [phone].    Julieta Tyler RN

## 2022-07-22 NOTE — TELEPHONE ENCOUNTER
RN called MFM and relayed pt does not want to do fetal echo.    Katarina Cortés RN on 7/22/2022 at 3:28 PM

## 2022-07-25 NOTE — TELEPHONE ENCOUNTER
Pt wrote back on the [employer] message stating when she spoke to the doctor at [facility] they told her she does not need to do the fetal echo so she is declining.  [facility] was notified.    Julieta Tyler RN

## 2022-07-27 ENCOUNTER — PRENATAL OFFICE VISIT (OUTPATIENT)
Dept: OBGYN | Facility: CLINIC | Age: 32
End: 2022-07-27
Payer: COMMERCIAL

## 2022-07-27 VITALS
DIASTOLIC BLOOD PRESSURE: 77 MMHG | SYSTOLIC BLOOD PRESSURE: 115 MMHG | WEIGHT: 178.8 LBS | BODY MASS INDEX: 28.43 KG/M2 | HEART RATE: 87 BPM

## 2022-07-27 DIAGNOSIS — E03.9 HYPOTHYROIDISM, UNSPECIFIED TYPE: ICD-10-CM

## 2022-07-27 DIAGNOSIS — O26.899 RH NEGATIVE STATE IN ANTEPARTUM PERIOD: ICD-10-CM

## 2022-07-27 DIAGNOSIS — O09.00 SUPERVISION OF PREGNANCY WITH HISTORY OF INFERTILITY: Primary | ICD-10-CM

## 2022-07-27 DIAGNOSIS — Z67.91 RH NEGATIVE STATE IN ANTEPARTUM PERIOD: ICD-10-CM

## 2022-07-27 PROCEDURE — 99207 PR PRENATAL VISIT: CPT | Performed by: OBSTETRICS & GYNECOLOGY

## 2022-07-27 NOTE — PROGRESS NOTES
Presents for routine  appointment.     No complaints.    No LOF/VB/Ctxs.  +FM  ROS:   and GI  negative.     /77   Pulse 87   Wt 81.1 kg (178 lb 12.8 oz)   BMI 28.43 kg/m        A/P:  31 year old  at 35w5d RAMO      Pregnancy Complications:  - IVF pregnancy, level II at 30wks. Weekly BPP at 36wks  - Hypothyroid, on synthroid. Last TSH 0.9   -Rh neg s/p rhogam     Routine Prenatal Care:  -Signs and symptoms of  labor discussed as well as fetal kick counts  -Group B Strep at 36+ weeks   -Patient to bring birth plan to next visit  -Peds: Undecided, ?[facility]  -Contraception: Declines  -Birth plan questions answered    Follow up in 1 week    Gertrudis Chang, DO

## 2022-08-03 NOTE — PROGRESS NOTES
Patient presents for routine prenatal visit. Prenatal flowsheet reviewed and updated as needed.  Denies vaginal bleeding, loss of fluid, contractions or cramping. Denies headache, nausea/vomiting, upper abdominal pain, vision changes, lower extremity swelling, chest pain or shortness of breath.     Anticipatory guidance appropriate for gestational age reviewed.  PE: See OB vitals    Pregnancy complicated by:  IVF pregnancy-needs to start weekly BPP-standing orders entered. Discussed timing of delivery.  Hypothyroidism-TSH stable.  Has been noticing some itching on the bottoms of her feet since last visit. No symptoms on hands. Check ICP labs. Reviewed symptom relief measures to try at home.    Routine prenatal care:  GBS done.     Questions asked and answered. Next OB visit in 1 week(s) with OB Physician.    Vanessa LEE CNP

## 2022-08-04 ENCOUNTER — PRENATAL OFFICE VISIT (OUTPATIENT)
Dept: OBGYN | Facility: CLINIC | Age: 32
End: 2022-08-04
Payer: COMMERCIAL

## 2022-08-04 VITALS
HEART RATE: 91 BPM | OXYGEN SATURATION: 96 % | SYSTOLIC BLOOD PRESSURE: 111 MMHG | WEIGHT: 179 LBS | DIASTOLIC BLOOD PRESSURE: 77 MMHG | BODY MASS INDEX: 28.09 KG/M2 | HEIGHT: 67 IN

## 2022-08-04 DIAGNOSIS — O99.713 PRURITUS OF PREGNANCY IN THIRD TRIMESTER: ICD-10-CM

## 2022-08-04 DIAGNOSIS — O09.00 SUPERVISION OF PREGNANCY WITH HISTORY OF INFERTILITY: Primary | ICD-10-CM

## 2022-08-04 DIAGNOSIS — L29.9 PRURITUS OF PREGNANCY IN THIRD TRIMESTER: ICD-10-CM

## 2022-08-04 PROCEDURE — 87653 STREP B DNA AMP PROBE: CPT | Performed by: NURSE PRACTITIONER

## 2022-08-04 PROCEDURE — 99207 PR PRENATAL VISIT: CPT | Performed by: NURSE PRACTITIONER

## 2022-08-04 ASSESSMENT — PAIN SCALES - GENERAL: PAINLEVEL: NO PAIN (0)

## 2022-08-04 NOTE — PATIENT INSTRUCTIONS
If you have any questions regarding your visit, Please contact your care team.     [facility]: [phone]  To Schedule an Appointment 24/7  Call: [phone] HOU[facility]     Vanessa Zavala- APRN CNP      Esvin Bray-URMILA Rendon-URMILA Bryant-Surgery Scheduler  [facility]-Surgery Scheduler         Monday 7:30 am-5:00 pm    Tuesday 8:00 am-4:00 pm    Wednesday 7:30 am-4:00 pm  [place]    Thursday 8:00 am-11:00 am    Friday 7:30 am-4:00 pm [place] [address] [place] [address] [phone] ask for [facility]  [phone] Fax    Imaging Scheduling all locations  [phone]     [facility] Labor and Delivery  [address] [place] [address] Dr.  [place], [place] [id number] [phone]         Urgent Care locations:  [facility]   Monday-Friday  10 am - 8 pm  Saturday and Sunday   9 am - 5 pm     [phone]   If you need a medication refill, please contact your pharmacy. Please allow 3 business days for your refill to be completed.  As always, Thank you for trusting us with your healthcare needs!      see additional instructions from your care team below

## 2022-08-05 ENCOUNTER — LAB (OUTPATIENT)
Dept: LAB | Facility: CLINIC | Age: 32
End: 2022-08-05

## 2022-08-05 ENCOUNTER — ANCILLARY PROCEDURE (OUTPATIENT)
Dept: ULTRASOUND IMAGING | Facility: CLINIC | Age: 32
End: 2022-08-05
Attending: NURSE PRACTITIONER
Payer: COMMERCIAL

## 2022-08-05 DIAGNOSIS — O09.00 SUPERVISION OF PREGNANCY WITH HISTORY OF INFERTILITY: ICD-10-CM

## 2022-08-05 DIAGNOSIS — O99.713 PRURITUS OF PREGNANCY IN THIRD TRIMESTER: ICD-10-CM

## 2022-08-05 DIAGNOSIS — L29.9 PRURITUS OF PREGNANCY IN THIRD TRIMESTER: ICD-10-CM

## 2022-08-05 LAB
ALBUMIN SERPL-MCNC: 2.8 G/DL (ref 3.4–5)
ALP SERPL-CCNC: 183 U/L (ref 40–150)
ALT SERPL W P-5'-P-CCNC: 20 U/L (ref 0–50)
AST SERPL W P-5'-P-CCNC: 19 U/L (ref 0–45)
BILIRUB DIRECT SERPL-MCNC: 0.1 MG/DL (ref 0–0.2)
BILIRUB SERPL-MCNC: 0.5 MG/DL (ref 0.2–1.3)
GP B STREP DNA SPEC QL NAA+PROBE: NEGATIVE
PROT SERPL-MCNC: 6.7 G/DL (ref 6.8–8.8)

## 2022-08-05 PROCEDURE — 76819 FETAL BIOPHYS PROFIL W/O NST: CPT

## 2022-08-05 PROCEDURE — 83789 MASS SPECTROMETRY QUAL/QUAN: CPT | Mod: 90

## 2022-08-05 PROCEDURE — 99000 SPECIMEN HANDLING OFFICE-LAB: CPT

## 2022-08-05 PROCEDURE — 80076 HEPATIC FUNCTION PANEL: CPT

## 2022-08-05 PROCEDURE — 36415 COLL VENOUS BLD VENIPUNCTURE: CPT

## 2022-08-10 ENCOUNTER — ANCILLARY PROCEDURE (OUTPATIENT)
Dept: ULTRASOUND IMAGING | Facility: CLINIC | Age: 32
End: 2022-08-10
Attending: NURSE PRACTITIONER
Payer: COMMERCIAL

## 2022-08-10 ENCOUNTER — PRENATAL OFFICE VISIT (OUTPATIENT)
Dept: OBGYN | Facility: CLINIC | Age: 32
End: 2022-08-10

## 2022-08-10 VITALS
WEIGHT: 181.6 LBS | SYSTOLIC BLOOD PRESSURE: 131 MMHG | HEART RATE: 91 BPM | DIASTOLIC BLOOD PRESSURE: 83 MMHG | BODY MASS INDEX: 28.87 KG/M2

## 2022-08-10 DIAGNOSIS — O09.00 SUPERVISION OF PREGNANCY WITH HISTORY OF INFERTILITY: Primary | ICD-10-CM

## 2022-08-10 DIAGNOSIS — O09.00 SUPERVISION OF PREGNANCY WITH HISTORY OF INFERTILITY: ICD-10-CM

## 2022-08-10 PROCEDURE — 76819 FETAL BIOPHYS PROFIL W/O NST: CPT | Mod: TC | Performed by: RADIOLOGY

## 2022-08-10 PROCEDURE — 99207 PR PRENATAL VISIT: CPT | Performed by: OBSTETRICS & GYNECOLOGY

## 2022-08-10 NOTE — PROGRESS NOTES
Presents for routine  appointment.    Pruritis on soles, mild.       No LOF/VB/Ctxs.  +FM  ROS:   and GI  negative.     /83   Pulse 91   Wt 82.4 kg (181 lb 9.6 oz)   BMI 28.87 kg/m      Results for orders placed or performed in visit on 08/10/22    OB Fetal Biophys Prf wo NonStrs Singls Sgl    Narrative    ULTRASOUND OBSTETRIC FETAL BIOPHYSICAL PROFILE WITHOUT NONSTRESS TEST  SINGLE   8/10/2022 8:06 AM     HISTORY: Supervision of pregnancy with history of infertility.    COMPARISON: None.    FINDINGS:  Vertex presentation. Fetal heart rate 134 bpm. Venous lakes  at the placenta. Inferior edge of the placenta not able to be  visualized for assessment. Cervix unable to be visualized.    Biophysical profile:  Fetal breathing movements: 2 points.  Gross body movements: 2 points.  Fetal tone: 2 points.  Amniotic fluid:  MVP is 4.4 cm, 2 points.    Total score: 8 points.      Impression    IMPRESSION:  Biophysical profile score is 8 out of 8 points.        A/P:  31 year old  at 37w5d RAMO      Pregnancy Complications:  - IVF pregnancy, level II at 30wks. Weekly BPPs. IOL planning discussed. Patient ideally would like to wait until due date or later to have an IOL.  - Hypothyroid, on synthroid. Last TSH 0.9   - Rh neg s/p rhogam  -Placental lakes  -Pruritis, BA pending. LFTs nml    Routine Prenatal Care:  -Peds: Undecided, ?[facility]  -Contraception: Declines  -GBS Neg    Follow up in 1 week    Gertrudis Chang,

## 2022-08-11 LAB
BILE AC SERPL-SCNC: 1.8 UMOL/L
CDCAE SERPL-SCNC: 0.7 UMOL/L
CHOLATE SERPL-SCNC: 0.5 UMOL/L
DO-CHOLATE SERPL-SCNC: 0.3 UMOL/L
URSODEOXYCHOLATE SERPL-SCNC: 0.3 UMOL/L

## 2022-08-15 ENCOUNTER — OFFICE VISIT (OUTPATIENT)
Dept: OPTOMETRY | Facility: CLINIC | Age: 32
End: 2022-08-15
Payer: COMMERCIAL

## 2022-08-15 DIAGNOSIS — H52.223 REGULAR ASTIGMATISM OF BOTH EYES: ICD-10-CM

## 2022-08-15 DIAGNOSIS — H52.13 MYOPIA OF BOTH EYES: Primary | ICD-10-CM

## 2022-08-15 PROCEDURE — 99207 PR NO CHARGE LOS: CPT | Performed by: OPTOMETRIST

## 2022-08-15 ASSESSMENT — REFRACTION_CURRENTRX
OS_BRAND: J&J ACUVUE OASYS FOR ASTIGMATISM BC 8.6, D 14.5
OS_BRAND: J&J ACUVUE OASYS BC 8.4, D 14.0
OD_CYLINDER: -0.75
OD_SPHERE: -0.50
OD_SPHERE: -1.00
OD_BRAND: J&J ACUVUE OASYS FOR ASTIGMATISM BC 8.6, D 14.5
OS_CYLINDER: -0.75
OS_SPHERE: -0.25
OD_AXIS: 010
OD_BRAND: J&J ACUVUE OASYS BC 8.4, D 14.0
OS_AXIS: 140
OS_SPHERE: -0.75

## 2022-08-15 ASSESSMENT — VISUAL ACUITY
METHOD: SNELLEN - LINEAR
CORRECTION_TYPE: CONTACTS
OD_CC: 20/20
OS_CC: 20/20
OS_CC: 20/20
OD_CC: 20/20

## 2022-08-15 NOTE — PATIENT INSTRUCTIONS
Contact lenses prescription released to patient today.     Return in 2 years for eye exam,  unless health reason arises     Catarina Whitney, OD  [phone]

## 2022-08-15 NOTE — LETTER
8/15/2022         RE: Lizabeth Dye  [address]  [facility] [address]        Dear Colleague,    Thank you for referring your patient, Lizabeth Dye, to the [facility]. Please see a copy of my visit note below.    Chief Complaint   Patient presents with     ContactLens Dispense       Contact lenses dispensed. As soon as she put the lenses in she said that she thought they were uncomfortable and wasn't going to like them.     Gayatri Coronado Optometric Assistant        OBJECTIVE: See Ophthalmology exam     ASSESSMENT:    ICD-10-CM    1. Myopia of both eyes  H52.13    2. Regular astigmatism of both eyes  H52.223       PLAN:  Patient Instructions   Contact lenses prescription released to patient today.    Return in 2 years for eye exam,  unless health reason arises     Catarina Whitney, OD  [phone]                                       Again, thank you for allowing me to participate in the care of your patient.        Sincerely,        Catarina Whitney, OD

## 2022-08-15 NOTE — PROGRESS NOTES
Chief Complaint   Patient presents with     ContactLens Dispense       Contact lenses dispensed. As soon as she put the lenses in she said that she thought they were uncomfortable and wasn't going to like them.     Gayatri Coronado Optometric Assistant        OBJECTIVE: See Ophthalmology exam     ASSESSMENT:    ICD-10-CM    1. Myopia of both eyes  H52.13    2. Regular astigmatism of both eyes  H52.223       PLAN:  Patient Instructions   Contact lenses prescription released to patient today.    Return in 2 years for eye exam,  unless health reason arises     Catarina Whitney, OD  [phone]

## 2022-08-16 ENCOUNTER — NURSE TRIAGE (OUTPATIENT)
Dept: NURSING | Facility: CLINIC | Age: 32
End: 2022-08-16

## 2022-08-17 ENCOUNTER — HOSPITAL (OUTPATIENT)
Dept: OBGYN | Facility: CLINIC | Age: 32
End: 2022-08-17

## 2022-08-17 ENCOUNTER — ANESTHESIA (OUTPATIENT)
Dept: OBGYN | Facility: CLINIC | Age: 32
End: 2022-08-17
Payer: COMMERCIAL

## 2022-08-17 ENCOUNTER — HOSPITAL ENCOUNTER (INPATIENT)
Facility: CLINIC | Age: 32
LOS: 3 days | Discharge: HOME HEALTH | End: 2022-08-20
Attending: OBSTETRICS & GYNECOLOGY | Admitting: OBSTETRICS & GYNECOLOGY
Payer: COMMERCIAL

## 2022-08-17 ENCOUNTER — ANESTHESIA EVENT (OUTPATIENT)
Dept: OBGYN | Facility: CLINIC | Age: 32
End: 2022-08-17
Payer: COMMERCIAL

## 2022-08-17 DIAGNOSIS — Z98.891 S/P CESAREAN SECTION: Primary | ICD-10-CM

## 2022-08-17 DIAGNOSIS — G89.18 ACUTE POST-OPERATIVE PAIN: ICD-10-CM

## 2022-08-17 DIAGNOSIS — O13.3 GESTATIONAL HYPERTENSION, THIRD TRIMESTER: ICD-10-CM

## 2022-08-17 PROBLEM — O42.90 PROM (PREMATURE RUPTURE OF MEMBRANES): Status: ACTIVE | Noted: 2022-08-17

## 2022-08-17 LAB
ABO/RH(D): ABNORMAL
ANTIBODY ID: NORMAL
ANTIBODY SCREEN: POSITIVE
ERYTHROCYTE [DISTWIDTH] IN BLOOD BY AUTOMATED COUNT: 12.6 % (ref 10–15)
HCT VFR BLD AUTO: 40.9 % (ref 35–47)
HGB BLD-MCNC: 13 G/DL (ref 11.7–15.7)
MCH RBC QN AUTO: 28.7 PG (ref 26.5–33)
MCHC RBC AUTO-ENTMCNC: 31.8 G/DL (ref 31.5–36.5)
MCV RBC AUTO: 90 FL (ref 78–100)
PLATELET # BLD AUTO: 232 10E3/UL (ref 150–450)
RBC # BLD AUTO: 4.53 10E6/UL (ref 3.8–5.2)
RUPTURE OF FETAL MEMBRANES BY ROM PLUS: POSITIVE
SARS-COV-2 RNA RESP QL NAA+PROBE: NEGATIVE
SPECIMEN EXPIRATION DATE: ABNORMAL
SPECIMEN EXPIRATION DATE: NORMAL
T PALLIDUM AB SER QL: NONREACTIVE
WBC # BLD AUTO: 12 10E3/UL (ref 4–11)

## 2022-08-17 PROCEDURE — 258N000003 HC RX IP 258 OP 636

## 2022-08-17 PROCEDURE — 85014 HEMATOCRIT: CPT

## 2022-08-17 PROCEDURE — 258N000003 HC RX IP 258 OP 636: Performed by: STUDENT IN AN ORGANIZED HEALTH CARE EDUCATION/TRAINING PROGRAM

## 2022-08-17 PROCEDURE — 87635 SARS-COV-2 COVID-19 AMP PRB: CPT

## 2022-08-17 PROCEDURE — 250N000011 HC RX IP 250 OP 636

## 2022-08-17 PROCEDURE — 86901 BLOOD TYPING SEROLOGIC RH(D): CPT

## 2022-08-17 PROCEDURE — 250N000013 HC RX MED GY IP 250 OP 250 PS 637

## 2022-08-17 PROCEDURE — 250N000011 HC RX IP 250 OP 636: Performed by: STUDENT IN AN ORGANIZED HEALTH CARE EDUCATION/TRAINING PROGRAM

## 2022-08-17 PROCEDURE — 258N000003 HC RX IP 258 OP 636: Performed by: OBSTETRICS & GYNECOLOGY

## 2022-08-17 PROCEDURE — 84112 EVAL AMNIOTIC FLUID PROTEIN: CPT

## 2022-08-17 PROCEDURE — 86780 TREPONEMA PALLIDUM: CPT

## 2022-08-17 PROCEDURE — 86870 RBC ANTIBODY IDENTIFICATION: CPT

## 2022-08-17 PROCEDURE — 250N000009 HC RX 250

## 2022-08-17 PROCEDURE — 36415 COLL VENOUS BLD VENIPUNCTURE: CPT

## 2022-08-17 PROCEDURE — 3E0R3BZ INTRODUCTION OF ANESTHETIC AGENT INTO SPINAL CANAL, PERCUTANEOUS APPROACH: ICD-10-PCS | Performed by: STUDENT IN AN ORGANIZED HEALTH CARE EDUCATION/TRAINING PROGRAM

## 2022-08-17 PROCEDURE — 120N000002 HC R&B MED SURG/OB UMMC

## 2022-08-17 PROCEDURE — 00HU33Z INSERTION OF INFUSION DEVICE INTO SPINAL CANAL, PERCUTANEOUS APPROACH: ICD-10-PCS | Performed by: STUDENT IN AN ORGANIZED HEALTH CARE EDUCATION/TRAINING PROGRAM

## 2022-08-17 PROCEDURE — G0463 HOSPITAL OUTPT CLINIC VISIT: HCPCS

## 2022-08-17 PROCEDURE — 99221 1ST HOSP IP/OBS SF/LOW 40: CPT | Mod: GC | Performed by: OBSTETRICS & GYNECOLOGY

## 2022-08-17 RX ORDER — NALBUPHINE HYDROCHLORIDE 10 MG/ML
2.5-5 INJECTION, SOLUTION INTRAMUSCULAR; INTRAVENOUS; SUBCUTANEOUS EVERY 6 HOURS PRN
Status: CANCELLED | OUTPATIENT
Start: 2022-08-17

## 2022-08-17 RX ORDER — METOCLOPRAMIDE HYDROCHLORIDE 5 MG/ML
10 INJECTION INTRAMUSCULAR; INTRAVENOUS EVERY 6 HOURS PRN
Status: DISCONTINUED | OUTPATIENT
Start: 2022-08-17 | End: 2022-08-20 | Stop reason: HOSPADM

## 2022-08-17 RX ORDER — LIDOCAINE HYDROCHLORIDE AND EPINEPHRINE 15; 5 MG/ML; UG/ML
3 INJECTION, SOLUTION EPIDURAL
Status: DISCONTINUED | OUTPATIENT
Start: 2022-08-17 | End: 2022-08-18

## 2022-08-17 RX ORDER — LIDOCAINE 40 MG/G
CREAM TOPICAL
Status: DISCONTINUED | OUTPATIENT
Start: 2022-08-17 | End: 2022-08-18

## 2022-08-17 RX ORDER — SODIUM CHLORIDE, SODIUM LACTATE, POTASSIUM CHLORIDE, CALCIUM CHLORIDE 600; 310; 30; 20 MG/100ML; MG/100ML; MG/100ML; MG/100ML
INJECTION, SOLUTION INTRAVENOUS CONTINUOUS
Status: DISCONTINUED | OUTPATIENT
Start: 2022-08-17 | End: 2022-08-18

## 2022-08-17 RX ORDER — FENTANYL/ROPIVACAINE/NS/PF 2MCG/ML-.1
PLASTIC BAG, INJECTION (ML) EPIDURAL
Status: DISCONTINUED | OUTPATIENT
Start: 2022-08-17 | End: 2022-08-18

## 2022-08-17 RX ORDER — HYDROXYZINE HYDROCHLORIDE 50 MG/1
50 TABLET, FILM COATED ORAL
Status: DISCONTINUED | OUTPATIENT
Start: 2022-08-17 | End: 2022-08-18

## 2022-08-17 RX ORDER — FENTANYL CITRATE-0.9 % NACL/PF 10 MCG/ML
100 PLASTIC BAG, INJECTION (ML) INTRAVENOUS EVERY 5 MIN PRN
Status: DISCONTINUED | OUTPATIENT
Start: 2022-08-17 | End: 2022-08-18

## 2022-08-17 RX ORDER — NALOXONE HYDROCHLORIDE 0.4 MG/ML
0.2 INJECTION, SOLUTION INTRAMUSCULAR; INTRAVENOUS; SUBCUTANEOUS
Status: DISCONTINUED | OUTPATIENT
Start: 2022-08-17 | End: 2022-08-20 | Stop reason: HOSPADM

## 2022-08-17 RX ORDER — PROCHLORPERAZINE MALEATE 10 MG
10 TABLET ORAL EVERY 6 HOURS PRN
Status: DISCONTINUED | OUTPATIENT
Start: 2022-08-17 | End: 2022-08-20 | Stop reason: HOSPADM

## 2022-08-17 RX ORDER — OXYTOCIN/0.9 % SODIUM CHLORIDE 30/500 ML
340 PLASTIC BAG, INJECTION (ML) INTRAVENOUS CONTINUOUS PRN
Status: DISCONTINUED | OUTPATIENT
Start: 2022-08-17 | End: 2022-08-20 | Stop reason: HOSPADM

## 2022-08-17 RX ORDER — OXYTOCIN/0.9 % SODIUM CHLORIDE 30/500 ML
1-24 PLASTIC BAG, INJECTION (ML) INTRAVENOUS CONTINUOUS
Status: DISCONTINUED | OUTPATIENT
Start: 2022-08-17 | End: 2022-08-18

## 2022-08-17 RX ORDER — HYDROXYZINE HYDROCHLORIDE 25 MG/1
50 TABLET, FILM COATED ORAL EVERY 6 HOURS PRN
Status: DISCONTINUED | OUTPATIENT
Start: 2022-08-17 | End: 2022-08-20 | Stop reason: HOSPADM

## 2022-08-17 RX ORDER — CITRIC ACID/SODIUM CITRATE 334-500MG
30 SOLUTION, ORAL ORAL
Status: COMPLETED | OUTPATIENT
Start: 2022-08-17 | End: 2022-08-18

## 2022-08-17 RX ORDER — NALOXONE HYDROCHLORIDE 0.4 MG/ML
0.4 INJECTION, SOLUTION INTRAMUSCULAR; INTRAVENOUS; SUBCUTANEOUS
Status: DISCONTINUED | OUTPATIENT
Start: 2022-08-17 | End: 2022-08-20 | Stop reason: HOSPADM

## 2022-08-17 RX ORDER — OXYTOCIN 10 [USP'U]/ML
10 INJECTION, SOLUTION INTRAMUSCULAR; INTRAVENOUS
Status: DISCONTINUED | OUTPATIENT
Start: 2022-08-17 | End: 2022-08-20 | Stop reason: HOSPADM

## 2022-08-17 RX ORDER — OXYTOCIN 10 [USP'U]/ML
INJECTION, SOLUTION INTRAMUSCULAR; INTRAVENOUS
Status: DISCONTINUED
Start: 2022-08-17 | End: 2022-08-18 | Stop reason: HOSPADM

## 2022-08-17 RX ORDER — TERBUTALINE SULFATE 1 MG/ML
0.25 INJECTION, SOLUTION SUBCUTANEOUS
Status: DISCONTINUED | OUTPATIENT
Start: 2022-08-17 | End: 2022-08-18

## 2022-08-17 RX ORDER — CARBOPROST TROMETHAMINE 250 UG/ML
250 INJECTION, SOLUTION INTRAMUSCULAR
Status: DISCONTINUED | OUTPATIENT
Start: 2022-08-17 | End: 2022-08-20 | Stop reason: HOSPADM

## 2022-08-17 RX ORDER — BUPIVACAINE HYDROCHLORIDE 2.5 MG/ML
INJECTION, SOLUTION EPIDURAL; INFILTRATION; INTRACAUDAL PRN
Status: DISCONTINUED | OUTPATIENT
Start: 2022-08-17 | End: 2022-08-18

## 2022-08-17 RX ORDER — PROCHLORPERAZINE 25 MG
25 SUPPOSITORY, RECTAL RECTAL EVERY 12 HOURS PRN
Status: DISCONTINUED | OUTPATIENT
Start: 2022-08-17 | End: 2022-08-20 | Stop reason: HOSPADM

## 2022-08-17 RX ORDER — METHYLERGONOVINE MALEATE 0.2 MG/ML
200 INJECTION INTRAVENOUS
Status: DISCONTINUED | OUTPATIENT
Start: 2022-08-17 | End: 2022-08-20 | Stop reason: HOSPADM

## 2022-08-17 RX ORDER — MISOPROSTOL 200 UG/1
TABLET ORAL
Status: DISCONTINUED
Start: 2022-08-17 | End: 2022-08-18 | Stop reason: HOSPADM

## 2022-08-17 RX ORDER — NALBUPHINE HYDROCHLORIDE 10 MG/ML
2.5-5 INJECTION, SOLUTION INTRAMUSCULAR; INTRAVENOUS; SUBCUTANEOUS EVERY 6 HOURS PRN
Status: DISCONTINUED | OUTPATIENT
Start: 2022-08-17 | End: 2022-08-20 | Stop reason: HOSPADM

## 2022-08-17 RX ORDER — FENTANYL CITRATE 50 UG/ML
100 INJECTION, SOLUTION INTRAMUSCULAR; INTRAVENOUS
Status: DISCONTINUED | OUTPATIENT
Start: 2022-08-17 | End: 2022-08-20 | Stop reason: HOSPADM

## 2022-08-17 RX ORDER — SODIUM CHLORIDE, SODIUM LACTATE, POTASSIUM CHLORIDE, CALCIUM CHLORIDE 600; 310; 30; 20 MG/100ML; MG/100ML; MG/100ML; MG/100ML
INJECTION, SOLUTION INTRAVENOUS CONTINUOUS PRN
Status: DISCONTINUED | OUTPATIENT
Start: 2022-08-17 | End: 2022-08-18

## 2022-08-17 RX ORDER — OXYTOCIN/0.9 % SODIUM CHLORIDE 30/500 ML
PLASTIC BAG, INJECTION (ML) INTRAVENOUS
Status: DISCONTINUED
Start: 2022-08-17 | End: 2022-08-18 | Stop reason: HOSPADM

## 2022-08-17 RX ORDER — TRANEXAMIC ACID 10 MG/ML
1 INJECTION, SOLUTION INTRAVENOUS EVERY 30 MIN PRN
Status: DISCONTINUED | OUTPATIENT
Start: 2022-08-17 | End: 2022-08-20 | Stop reason: HOSPADM

## 2022-08-17 RX ORDER — METOCLOPRAMIDE 10 MG/1
10 TABLET ORAL EVERY 6 HOURS PRN
Status: DISCONTINUED | OUTPATIENT
Start: 2022-08-17 | End: 2022-08-20 | Stop reason: HOSPADM

## 2022-08-17 RX ORDER — ONDANSETRON 4 MG/1
4 TABLET, ORALLY DISINTEGRATING ORAL EVERY 6 HOURS PRN
Status: DISCONTINUED | OUTPATIENT
Start: 2022-08-17 | End: 2022-08-20 | Stop reason: HOSPADM

## 2022-08-17 RX ORDER — FENTANYL/ROPIVACAINE/NS/PF 2MCG/ML-.1
PLASTIC BAG, INJECTION (ML) EPIDURAL
Status: DISCONTINUED | OUTPATIENT
Start: 2022-08-17 | End: 2022-08-17

## 2022-08-17 RX ORDER — HYDROXYZINE HYDROCHLORIDE 25 MG/1
25 TABLET, FILM COATED ORAL EVERY 6 HOURS PRN
Status: DISCONTINUED | OUTPATIENT
Start: 2022-08-17 | End: 2022-08-20 | Stop reason: HOSPADM

## 2022-08-17 RX ORDER — MISOPROSTOL 200 UG/1
400 TABLET ORAL
Status: DISCONTINUED | OUTPATIENT
Start: 2022-08-17 | End: 2022-08-20 | Stop reason: HOSPADM

## 2022-08-17 RX ORDER — PRENATAL VIT/IRON FUM/FOLIC AC 27MG-0.8MG
1 TABLET ORAL DAILY
Status: DISCONTINUED | OUTPATIENT
Start: 2022-08-17 | End: 2022-08-20 | Stop reason: HOSPADM

## 2022-08-17 RX ORDER — ONDANSETRON 2 MG/ML
4 INJECTION INTRAMUSCULAR; INTRAVENOUS EVERY 6 HOURS PRN
Status: DISCONTINUED | OUTPATIENT
Start: 2022-08-17 | End: 2022-08-20 | Stop reason: HOSPADM

## 2022-08-17 RX ORDER — MISOPROSTOL 100 UG/1
25 TABLET ORAL
Status: DISCONTINUED | OUTPATIENT
Start: 2022-08-17 | End: 2022-08-17

## 2022-08-17 RX ORDER — LIDOCAINE HYDROCHLORIDE 10 MG/ML
INJECTION, SOLUTION EPIDURAL; INFILTRATION; INTRACAUDAL; PERINEURAL
Status: DISCONTINUED
Start: 2022-08-17 | End: 2022-08-18 | Stop reason: HOSPADM

## 2022-08-17 RX ORDER — FENTANYL/ROPIVACAINE/NS/PF 2MCG/ML-.1
PLASTIC BAG, INJECTION (ML) EPIDURAL
Status: COMPLETED
Start: 2022-08-17 | End: 2022-08-17

## 2022-08-17 RX ORDER — MISOPROSTOL 200 UG/1
800 TABLET ORAL
Status: DISCONTINUED | OUTPATIENT
Start: 2022-08-17 | End: 2022-08-20 | Stop reason: HOSPADM

## 2022-08-17 RX ORDER — MISOPROSTOL 100 UG/1
25 TABLET ORAL EVERY 4 HOURS PRN
Status: DISCONTINUED | OUTPATIENT
Start: 2022-08-17 | End: 2022-08-17

## 2022-08-17 RX ADMIN — BUPIVACAINE HYDROCHLORIDE 7 ML: 2.5 INJECTION, SOLUTION EPIDURAL; INFILTRATION; INTRACAUDAL; PERINEURAL at 23:12

## 2022-08-17 RX ADMIN — Medication 2 MILLI-UNITS/MIN: at 20:23

## 2022-08-17 RX ADMIN — Medication: at 16:05

## 2022-08-17 RX ADMIN — BUPIVACAINE HYDROCHLORIDE 10 ML: 2.5 INJECTION, SOLUTION EPIDURAL; INFILTRATION; INTRACAUDAL at 15:37

## 2022-08-17 RX ADMIN — Medication 10 ML: at 15:40

## 2022-08-17 RX ADMIN — SODIUM CHLORIDE, POTASSIUM CHLORIDE, SODIUM LACTATE AND CALCIUM CHLORIDE: 600; 310; 30; 20 INJECTION, SOLUTION INTRAVENOUS at 16:01

## 2022-08-17 RX ADMIN — HYDROXYZINE HYDROCHLORIDE 50 MG: 50 TABLET, FILM COATED ORAL at 02:04

## 2022-08-17 RX ADMIN — SODIUM CHLORIDE, POTASSIUM CHLORIDE, SODIUM LACTATE AND CALCIUM CHLORIDE 1000 ML: 600; 310; 30; 20 INJECTION, SOLUTION INTRAVENOUS at 14:52

## 2022-08-17 RX ADMIN — LEVOTHYROXINE SODIUM 125 MCG: 125 TABLET ORAL at 07:49

## 2022-08-17 RX ADMIN — MISOPROSTOL 25 MCG: 100 TABLET ORAL at 10:05

## 2022-08-17 RX ADMIN — MISOPROSTOL 25 MCG: 100 TABLET ORAL at 07:50

## 2022-08-17 RX ADMIN — FENTANYL CITRATE 100 MCG: 50 INJECTION, SOLUTION INTRAMUSCULAR; INTRAVENOUS at 15:11

## 2022-08-17 RX ADMIN — SODIUM CHLORIDE, POTASSIUM CHLORIDE, SODIUM LACTATE AND CALCIUM CHLORIDE: 600; 310; 30; 20 INJECTION, SOLUTION INTRAVENOUS at 23:39

## 2022-08-17 ASSESSMENT — ACTIVITIES OF DAILY LIVING (ADL)
FALL_HISTORY_WITHIN_LAST_SIX_MONTHS: NO
ADLS_ACUITY_SCORE: 20
ADLS_ACUITY_SCORE: 20
CHANGE_IN_FUNCTIONAL_STATUS_SINCE_ONSET_OF_CURRENT_ILLNESS/INJURY: NO
VISION_MANAGEMENT: GLASSES AND CONTACTS
ADLS_ACUITY_SCORE: 20
TOILETING_ISSUES: NO
ADLS_ACUITY_SCORE: 20
ADLS_ACUITY_SCORE: 20
DIFFICULTY_EATING/SWALLOWING: NO
ADLS_ACUITY_SCORE: 20
WEAR_GLASSES_OR_BLIND: YES
DRESSING/BATHING_DIFFICULTY: NO
ADLS_ACUITY_SCORE: 20
CONCENTRATING,_REMEMBERING_OR_MAKING_DECISIONS_DIFFICULTY: NO
DOING_ERRANDS_INDEPENDENTLY_DIFFICULTY: NO
WALKING_OR_CLIMBING_STAIRS_DIFFICULTY: NO
ADLS_ACUITY_SCORE: 20
ADLS_ACUITY_SCORE: 20

## 2022-08-17 ASSESSMENT — LIFESTYLE VARIABLES: TOBACCO_USE: 0

## 2022-08-17 NOTE — PROGRESS NOTES
[facility]  Labor Progress Note    S:  Patient is feeling much more uncomfortable with contractions. She is requesting a repeat cervical exam. Thinking about getting an epidural sometime soon but not just yet.    O:   Patient Vitals for the past 4 hrs:   Temp Temp src   22 1007 98.4  F (36.9  C) Oral     Gen: Breathing through contractions  SVE: 280/-2 (1230), per G2 chaperoned by RN     FHT: Baseline 135, moderate variability, present accelerations, isolated 2-minute deceleration with alli to 100  [place]illow: 4 contractions in 10 minutes        A/P:  Lizabeth Dye is a 31 year old  at 38w5d here for PROM Pregnancy complicated by hypothyroidism. Patient with regular, strong contraction pattern at this time s/p PO miso x2. Herron now 9, so will switch to pitocin if these space out.    #PROM  # IOL  - Augmentation: pitocin, prn   - SVE:2 80/-2 (1230)  - Membranes: SROM (2300 )  - Pain: desires epidural eventually  - Plan: Continue to observe. Start pitocin as needed.    FWB:  Isolated decel as above in the setting of borderline tachysystole, now improved; overall Category I, reactive and reassuring FHT    Lizabeth Khan MD, PGY-2  12:49 PM  [facility] Obstetrics & Gynecology Residency

## 2022-08-17 NOTE — TELEPHONE ENCOUNTER
"OB Triage Call      Is patient's OB/Midwife with the [facility] or [facility]? [facility]- Proceed with triage     Reason for call: Rupture of membranes    Assessment: There has been a steady stream for the last 5 minutes and in the last 2 minutes there has been a large amount.    Denies any abdominal pain or contractions.      Plan: L&D    Patient plans to deliver at [facility]    Patient's primary OB Provider is Donna/Sally      Per protocol recommendations Patient to be evaluated in L&D. Patient's primary OB is [facility].  [facility] and delivery at [facility] ([phone]) notified of patient's pending arrival.  Report given to Maia.       Is patient's delivering hospital on divert? Yes- If patient's hospital of choice is on divert, RN to page on-call provider for next steps and advise that L&D is full and to please advise. Per provider patient to go to Delivering Hospital: [facility] ([phone]).  L&D notified of patient's pending arrival per . Provider didn't notify L&D, triage RN to complete.   Provider Dr. Navarro paged at 11:06pm.   [facility] since she is far enough along.          38w4d    Estimated Date of Delivery: Aug 26, 2022        OB History    Para Term  AB Living   1 0 0 0 0 0   SAB IAB Ectopic Multiple Live Births   0 0 0 0 0      # Outcome Date GA Lbr Earle/2nd Weight Sex Delivery Anes PTL Lv   1 Current                No results found for: GBS       Kiersten Lozoya RN 22 10:59 PM  [facility] Nurse Advisor    Reason for Disposition    Leakage of fluid from vagina    Additional Information    Negative: [1] SEVERE abdominal pain (e.g., excruciating) AND [2] constant AND [3] present > 1 hour    Negative: Severe bleeding (e.g., continuous red blood from vagina, or large blood clots)    Negative: Umbilical cord hanging out of the vagina (shiny, white, curled appearance, \"like telephone cord\")    Negative: Uncontrollable urge to push (i.e., feels like " baby is coming out now)    Negative: Can see baby    Negative: Sounds like a life-threatening emergency to the triager    Negative: < 20 weeks pregnant    Negative: Vaginal bleeding    Protocols used: PREGNANCY - RUPTURE OF [id number]

## 2022-08-17 NOTE — PROGRESS NOTES
[facility] Labor and Delivery Progress Note    Lizabeth Dye MRN# [id number]   Age: 31 year old YOB: 1990           Subjective:   Starting to feel some contractions/cramping but not too uncomfortable yet            Objective:   Patient Vitals for the past 24 hrs:   BP Temp Temp src Resp Oximeter Heart Rate   22 1007 -- 98.4  F (36.9  C) Oral -- --   22 0745 120/70 98.7  F (37.1  C) Oral 16 84 bpm   22 0455 112/73 98.1  F (36.7  C) Oral 18 72 bpm   22 0030 123/74 98.2  F (36.8  C) Oral 16 85 bpm         Cervical Exam: % / -2      Membranes:   PROM    Fetal Heart Rate:    Monitor: external US    Variability: moderate (amplitude range 6 to 25 bpm)    Baseline Rate: normal range    Fetal Heart Rate Tracin/mod/accels/rare decel (2 in the past hour) lasting for <60 seconds and likely a late decel but difficult to trace contractions right now. Currently cat I           Assessment:   Lizabeth Dye is a 31 year old  who is 38w5d here with PROM since  at 2300           Plan:   -FHTs currently cat I but with rare late decelerations, will continue close monitoiring   -S/P oral misoprostol x2, would not recommend repeating another dose if FHTs are still intermittently cat II and would recommend either expectant management or pitocin at that point  -GBS neg   -Planning epidural for pain     Dispo: stable on L&D  MD Tete Cazares MD

## 2022-08-17 NOTE — PROGRESS NOTES
[facility]  Labor Progress Note    S:  Patient out of bath. Feeling more cramping. Would like to wait until 7 am prior to starting misoprostol.     O:   Patient Vitals for the past 4 hrs:   BP Temp Temp src Resp   22 0455 112/73 98.1  F (36.7  C) Oral 18     FHT: Baseline 125, ,moderate variability, present accelerations, absent decelerations  [place]: 1-3 contractions in 10 minutes    A/P:  Lizabeth Dye is a 31 year old  at 38w5d here for PROM Pregnancy complicated by hypothyroidism. Patient's cervix largely unchanged from admission without regular contractions. Recommendation to proceed with induction with misoprostol. Patient would like to wait until 0700.     #PROM  # IOL  - Augmentation: pitocin, prn   - SVE: /-2 (0500)  - Membranes: SROM  - Pain: desires epidural eventually  - Plan: Patient would like to wait one more hour prior to starting with induction. She is agreeable to PV misoprostol.      FWB:  Category I, reactive and reassuring FHT    Bridget Cortés MD  OB/GYN Resident, PGY-2

## 2022-08-17 NOTE — PLAN OF CARE
Labor is fairly progressing. See flow sheet for fetal and uterine monitoring. VSS, afebrile, patient still comfortable. Patient up ambulating in the yu. will continue to monitor labor  and notify the provider with changes

## 2022-08-17 NOTE — H&P
L&D History and Physical   2022   Lizabeth Dye  [id number]      HPI: Lizabeth Dye is a 31 year old  at 38w5d who presents with LOF.     She states that she is feeling well today. Pt felt a gush of clear, mucous fluid @ ~2300. She thinks that her membranes may have ruptured and decided to come for an evaluation. She states that she wants to naturally wait for labor if her membranes ruptured and prefers not yet to start with induction. Pt did mention she would like an epidural for pain control.       She denies headache, vision changes, chest pain, shortness of breath, fever, chills, nausea, vomiting or other systemic complaints. She denies vaginal bleeding and is feeling normal fetal movement.     Her pregnancy is notable for:  - IVF pregnancy   - Hypothyroidism, PTA levothyroxine  - Isolated sarah cisterna magna (US )    ROS: No headaches, vision changes, nausea, vomiting, fevers, chills, chest pain, SOB, abdominal pain, constipation, diarrhea, dysuria, changes in vaginal discharge or edema in extremities noted.     OBHX:   OB History    Para Term  AB Living   1 0 0 0 0 0   SAB IAB Ectopic Multiple Live Births   0 0 0 0 0      # Outcome Date GA Lbr Earle/2nd Weight Sex Delivery Anes PTL Lv   1 Current                Past Medical History:   Diagnosis Date     Hx of abnormal cervical Pap smear     ASCUS in ; nl pap in 2015- See care everywhere for details.     Migraine      Other specified hypothyroidism        Past Surgical History:   Procedure Laterality Date     NO HISTORY OF SURGERY         Medications:   No current facility-administered medications on file prior to encounter.  levothyroxine (SYNTHROID/LEVOTHROID) 125 MCG tablet, Take 1 tablet (125 mcg) by mouth daily  Prenatal Vit-Fe Fumarate-FA (PRENATAL MULTIVITAMIN W/IRON) 27-0.8 MG tablet, Take 1 tablet by mouth daily        Allergies   Allergen Reactions     No Clinical Screening - See Comments Itching     Cats,dogs        Family History   Problem Relation Age of Onset     Depression Mother      Breast Cancer Mother      Thyroid Disease Mother      Skin Cancer Mother      Diabetes Father      Thyroid Disease Father      Kidney Disease Father      Skin Cancer Father      Heart Disease Father      No Known Problems Brother      Leukemia Maternal Grandmother      Cerebrovascular Disease Maternal Grandfather      Emphysema Paternal Grandmother      Kidney failure Paternal Grandfather      Glaucoma No family hx of      Macular Degeneration No family hx of      Colon Cancer No family hx of        SocialHX:   Social History     Tobacco Use     Smoking status: Never Smoker     Smokeless tobacco: Never Used   Vaping Use     Vaping Use: Never used   Substance Use Topics     Alcohol use: Not Currently     Comment: 3 x/week-wine     Drug use: No       ROS: 10-point ROS negative except as indicated in HPI.    Physical Exam:  Vitals:    08/17/22 0030   BP: 123/74   BP Location: Right arm   Patient Position: Semi-Centeno's   Cuff Size: Adult Regular   Resp: 16   Temp: 98.2  F (36.8  C)   TempSrc: Oral     General: alert, oriented female, resting in bed in NAD  CV: well-perfused   Abdomen: soft, gravid, non-tender  Extremities: bilateral lower extremities non-tender with w/o edema    SVE: 1/50/-2  Membranes: SROM, clear fluid @2300     Presentation: Longitudinal cephalic by BUSUS    FHT: baseline 130, moderate variability, accelerations present, decelerations absent  [place]: irritable contractions in ten minutes    Prenatal ultrasounds:  [facility] Comprehensive US (07/05/22):   Impression:   1) Li intrauterine pregnancy at 32w 4d gestational age.   2) There is an isolated sarah cisterna magna. Otherwise, none of the anomalies commonly detected by ultrasound were evident in the detailed fetal anatomic survey,   however some views were suboptimal, as described above.   3) Growth parameters and estimated fetal weight were consistent with established  dates.   4) The amniotic fluid volume appeared normal.   5) Normal fetal activity for gestational age.     Prenatal Labs:   Lab Results   Component Value Date    AS Negative 2022    HEPBANG Nonreactive 2022    CHPCRT Negative 2022    GCPCRT Negative 2022    HGB 12.2 2022       GBS Status: Negative (22)      Lab Results   Component Value Date    PAP NIL 2018       Labs:    Lab on 2022   Component Date Value Ref Range Status     Ursodeoxycholic Ac 2022 0.3  0.0 - 1.0 umol/L Final     Cholic Acid 2022 0.5  0.0 - 1.9 umol/L Final     Chenodeoxycholic 2022 0.7  0.0 - 3.4 umol/L Final     Deoxycholic Acid 2022 0.3  0.0 - 2.5 umol/L Final     Bile Acids Total 2022 1.8  0.0 - 7.0 umol/L Final    INTERPRETIVE INFORMATION: Bile Acids, Fractionated and Total    This test was developed and its performance characteristics   determined by [facility]. It has not been cleared or   approved by the US Food and Drug Administration. This test   was performed in a CLIA certified laboratory and is   intended for clinical purposes.  Performed By: [facility]  [address] [place] [id number]  : Prabhjot Boyer MD, PhD     Bilirubin Total 2022 0.5  0.2 - 1.3 mg/dL Final     Bilirubin Direct 2022 0.1  0.0 - 0.2 mg/dL Final     Protein Total 2022 6.7 (A) 6.8 - 8.8 g/dL Final     Albumin 2022 2.8 (A) 3.4 - 5.0 g/dL Final     Alkaline Phosphatase 2022 183 (A) 40 - 150 U/L Final     AST 2022 19  0 - 45 U/L Final     ALT 2022 20  0 - 50 U/L Final       Assessment: 31 year old  at 38w5d here for PROM this evening.     Pregnancy also complicated by:   - IVF pregnancy   - Hypothyroidism   - Placental lakes  - Isolated sarah cisterna magna    Plan:  #PROM   - Admit to L&D   - ROM Plus Positive    - Patient desires expectant management and then open to induction with miso/pitocin  -  Discussed pain management during labor; patient desires epidural eventually   - GBS negative     #PNC:     - Rh negative   - Rubella immune   - GCT passed  - GBS negative  - Rhogam done       # Hypothyroidism   - Last TSH   - Well controlled on 125 mcg Synthroid   - Has not yet discussed post-partum synthroid changes    # Tan Cisterna Magna   - In the absence of other abnormalities, and in the setting of an otherwise normal appearing posterior fossa, this is considered an  isolated, incidental finding (22 [facility] US)    # Placental Lakes     - Posterior placenta   - BPP (22) indicates normal variant placental lakes     #Fetal Well Being  - Category I FHT    - Continue EFM     #Postpartum planning   -- Feeding: breastfeed   -- Contraception: not yet discussed       Patient seen and*care plan discussed under supervision of Dr. Buzz Pedraza III, MS3  [facility] [employer]    ATTESTATION  I was present with the medical student who participated in the service and in the documentation of the note. I have verified the history and personally performed the physical exam and medical decision making. I agree with the assessment and plan of care as documented in the note.    Bridget Cortés MD PGY2 2022 1230am        Physician Attestation   I, Saira Gerber MD, personally examined and evaluated this patient.  I discussed the patient with the resident/fellow and care team, and agree with the assessment and plan of care as documented in the note of 22.      I personally reviewed vital signs, medications, labs, and exam .    Key findings: 31 year old  at 38w5d with PROM. Discussed expectant versus augmentation. Patient prefer expectant management for now. Category 1 fetal tracing. Anticipate .  Saira Gerber MD  Date of Service (when I saw the patient): 22

## 2022-08-17 NOTE — PROGRESS NOTES
Status Update  Requested SVE at 0500; 1/70%/-2. Vital signs WDL, afebrile. Has been feeling mild cramping/contractiosn. Continues to leak clear fluid. Denies vaginal bleeding. Reports good fetal movement. Up to bath to promote comfort. Denies need for further interventions at this time. Plans to order breakfast and walk the halls.  is at bedside and supportive.

## 2022-08-17 NOTE — ANESTHESIA PROCEDURE NOTES
Epidural catheter Procedure Note    Pre-Procedure   Staff -        Anesthesiologist:  Beba Nunez MD       Resident/Fellow: David Pena MD       Other Anesthesia Staff: Annette Rico MD       Performed By: resident       Location: OB       Procedure Start/Stop Times: 8/17/2022 3:15 AM and 8/17/2022 3:36 PM       Pre-Anesthestic Checklist: patient identified, IV checked, risks and benefits discussed, informed consent, monitors and equipment checked, pre-op evaluation, at physician/surgeon's request and post-op pain management  Timeout:       Correct Patient: Yes        Correct Procedure: Yes        Correct Site: Yes        Correct Position: Yes   Procedure Documentation  Procedure: epidural catheter       Diagnosis: labor pain       Patient Position: sitting       Skin prep: Chloraprep       Local skin infiltrated with mL of 1% lidocaine.        Insertion Site: L2-3. (midline approach).       Technique: LORT saline        RICH at 6 cm.       Needle Type: BeVocal needle       Needle Gauge: 17.        Catheter: 19 G.          Catheter threaded easily.         4 cm epidural space.         Threaded 10 cm at skin.         # of attempts: 1 and  # of redirects:  1    Assessment/Narrative         Paresthesias: No.       Test dose of 3 mL lidocaine 1.5% w/ 1:200,000 epinephrine at 15:30 CDT.        .       Insertion/Infusion Method: LORT saline       Aspiration negative for Heme or CSF via Epidural Catheter.    Medication(s) Administered   0.125% bupivacaine 5 mL + fentanyl 20 mcg + NS 5 mL (Epidural) (Mixture components: bupivacaine HCl (PF) 0.25 % Soln, 5 mL; fentaNYL (PF) 100 MCG/2ML Soln, 20 mcg; sodium chloride 0.9 % Soln, 5 mL) - EPIDURAL   10 mL - 8/17/2022 3:37:00 PM  Medication Administration Time: 8/17/2022 3:15 AM

## 2022-08-17 NOTE — PROGRESS NOTES
S: OB Triage/Admission  B:  at 38.5 here for SROM. Allergies: Cats/Dogs Labs: B Negative, GBS: Negative, Rubella: Immune, HIV: Non-Reactive, Hep B: Non-Reactive. Pregnancy Related Complications: IVF, [place], Hypothyroidism.  A: Arrived to Labor & Delivery at 0008 for rule out rupture of membranes. Reports gush of clear/cloudy fluid around 2300 that continues to leak. Denies vaginal bleeding. EFM and [place] explained and applied to abdomen. Denies having contractions at this time. Vital signs WDL, afebrile. Denies headache, visual disturbances, and RUQ pain. Reports an uncomplicated pregnancy. Reports positive fetal movement. Dr. Cortés at bedside to assess. Bedside ultrasound performed to verify fetal presentation. ROM+ obtained and SVE performed by Dr. Cortés. SVE 1/50%/-2.  Darrin is present and supportive at bedside. Expecting a baby boy. Plans to breastfeed. [facility] in [place] for Pediatrics. Planning for an epidural eventually for pain. Open to movement, position changes, hydrotherapy, and aromatherapy.   R: Anticipate . Pain Management as desired.

## 2022-08-17 NOTE — PROGRESS NOTES
[facility]  Labor Progress Note    S:  Patient requested epidural a short time ago, which is now in place. She is resting now.    O:   No data found.     Gen: Breathing through contractions  SVE: /-2 (1230), per G2 chaperoned by RN - repeat deferred at this time to allow patient to rest    FHT: Baseline 125, period of minimal variability immediately after epidural placement which has resolved and moderate variability is now seen, present accelerations, no decelerations  [place]: 4 contractions in 10 minutes        A/P:  Lizabeth Dye is a 31 year old  at 38w5d here for PROM. Pregnancy complicated by hypothyroidism.    #PROM  # IOL  - Augmentation: pitocin prn   - SVE: /-2 (1230)  - Membranes: SROM (2300 )  - Pain: Epidural in place  - Plan: Continue to observe. Start pitocin as needed.    FWB:  period of minimal variability immediately after epidural placement, now reolved; overall Category I, reactive and reassuring FHT    Lizabeth Khan MD, PGY-2  4:39 PM  [facility] Obstetrics & Gynecology Residency

## 2022-08-17 NOTE — PLAN OF CARE
Labor is continue, see flowsheet for fetal and uterine monitoring. Patient stated not coping anymore with labor pain and requested epidural for pain control. Anesthetist called for epidural placement. Patient ready for epidural. Report to Germán KEARNS for care continue.

## 2022-08-17 NOTE — PROGRESS NOTES
Status Update  Requested to eat breakfast prior to taking Cytotec. Currently using electric breast pump to stimulate oxytocin. Provider paged and requested order for Cytotec be changed to oral since SROM'd.

## 2022-08-17 NOTE — ANESTHESIA PREPROCEDURE EVALUATION
"Anesthesia Pre-Procedure Evaluation    Patient: Lizabeth Dye   MRN: [id number] : 1990        Procedure :           Past Medical History:   Diagnosis Date     Hx of abnormal cervical Pap smear     ASCUS in ; nl pap in 2015- See care everywhere for details.     Migraine      Other specified hypothyroidism       Past Surgical History:   Procedure Laterality Date     NO HISTORY OF SURGERY        Allergies   Allergen Reactions     No Clinical Screening - See Comments Itching     Cats,dogs      Social History     Tobacco Use     Smoking status: Never Smoker     Smokeless tobacco: Never Used   Substance Use Topics     Alcohol use: Not Currently     Comment: 3 x/week-wine      Wt Readings from Last 1 Encounters:   08/10/22 82.4 kg (181 lb 9.6 oz)        Anesthesia Evaluation   Pt has had prior anesthetic. Type: MAC (sedation for IVF/egg retrieval ).    No history of anesthetic complications       ROS/MED HX  ENT/Pulmonary: Comment:   Patient had asthma as a child, but \"outgrew\" it since teenage years.     (+) allergic rhinitis,  (-) tobacco use   Neurologic:  - neg neurologic ROS     Cardiovascular:  - neg cardiovascular ROS  (-) hypertension   METS/Exercise Tolerance: 3 - Able to walk 1-2 blocks without stopping    Hematologic:  - neg hematologic  ROS     Musculoskeletal:  - neg musculoskeletal ROS     GI/Hepatic:  - neg GI/hepatic ROS     Renal/Genitourinary:       Endo:     (+) thyroid problem, hypothyroidism,     Psychiatric/Substance Use:  - neg psychiatric ROS     Infectious Disease:  - neg infectious disease ROS     Malignancy:  - neg malignancy ROS     Other:     (-) previous        Physical Exam    Airway  airway exam normal           Respiratory Devices and Support         Dental  no notable dental history         Cardiovascular   cardiovascular exam normal          Pulmonary   pulmonary exam normal                OUTSIDE LABS:  CBC:   Lab Results   Component Value Date    WBC 12.0 (H) " 2022    WBC 10.8 2022    HGB 13.0 2022    HGB 12.2 2022    HCT 40.9 2022    HCT 36.4 2022     2022     2022     BMP: No results found for: NA, POTASSIUM, CHLORIDE, CO2, BUN, CR, GLC  COAGS: No results found for: PTT, INR, FIBR  POC: No results found for: BGM, HCG, HCGS  HEPATIC:   Lab Results   Component Value Date    ALBUMIN 2.8 (L) 2022    PROTTOTAL 6.7 (L) 2022    ALT 20 2022    AST 19 2022    ALKPHOS 183 (H) 2022    BILITOTAL 0.5 2022     OTHER:   Lab Results   Component Value Date    TSH 0.90 2022    T4 1.01 2021       Anesthesia Plan    ASA Status:  2      Anesthesia Type: Epidural.              Consents    Anesthesia Plan(s) and associated risks, benefits, and realistic alternatives discussed. Questions answered and patient/representative(s) expressed understanding.    - Discussed:     - Discussed with:  Patient         Postoperative Care            Comments:    Other Comments: Pt needs to go to . Epidural is not working. Plan to stop epidural and give smaller dose of intrathecal Bupivacaine to avoid a high spinal   Fang Ospina MD  2022  11:41 AM       neg OB ROS.       Annette Rico MD

## 2022-08-18 LAB
ALT SERPL W P-5'-P-CCNC: 18 U/L (ref 0–50)
AST SERPL W P-5'-P-CCNC: 23 U/L (ref 0–45)
CREAT SERPL-MCNC: 0.56 MG/DL (ref 0.52–1.04)
ERYTHROCYTE [DISTWIDTH] IN BLOOD BY AUTOMATED COUNT: 12.5 % (ref 10–15)
GFR SERPL CREATININE-BSD FRML MDRD: >90 ML/MIN/1.73M2
HCT VFR BLD AUTO: 39.1 % (ref 35–47)
HGB BLD-MCNC: 13.2 G/DL (ref 11.7–15.7)
MCH RBC QN AUTO: 28.5 PG (ref 26.5–33)
MCHC RBC AUTO-ENTMCNC: 33.8 G/DL (ref 31.5–36.5)
MCV RBC AUTO: 84 FL (ref 78–100)
PLATELET # BLD AUTO: 236 10E3/UL (ref 150–450)
RBC # BLD AUTO: 4.63 10E6/UL (ref 3.8–5.2)
WBC # BLD AUTO: 20.7 10E3/UL (ref 4–11)

## 2022-08-18 PROCEDURE — 84450 TRANSFERASE (AST) (SGOT): CPT

## 2022-08-18 PROCEDURE — 120N000002 HC R&B MED SURG/OB UMMC

## 2022-08-18 PROCEDURE — 710N000010 HC RECOVERY PHASE 1, LEVEL 2, PER MIN: Performed by: OBSTETRICS & GYNECOLOGY

## 2022-08-18 PROCEDURE — 250N000009 HC RX 250

## 2022-08-18 PROCEDURE — 370N000017 HC ANESTHESIA TECHNICAL FEE, PER MIN: Performed by: OBSTETRICS & GYNECOLOGY

## 2022-08-18 PROCEDURE — 84460 ALANINE AMINO (ALT) (SGPT): CPT

## 2022-08-18 PROCEDURE — 271N000001 HC OR GENERAL SUPPLY NON-STERILE: Performed by: OBSTETRICS & GYNECOLOGY

## 2022-08-18 PROCEDURE — 258N000003 HC RX IP 258 OP 636: Performed by: STUDENT IN AN ORGANIZED HEALTH CARE EDUCATION/TRAINING PROGRAM

## 2022-08-18 PROCEDURE — 250N000011 HC RX IP 250 OP 636

## 2022-08-18 PROCEDURE — C9290 INJ, BUPIVACAINE LIPOSOME: HCPCS | Performed by: STUDENT IN AN ORGANIZED HEALTH CARE EDUCATION/TRAINING PROGRAM

## 2022-08-18 PROCEDURE — 36415 COLL VENOUS BLD VENIPUNCTURE: CPT

## 2022-08-18 PROCEDURE — 250N000009 HC RX 250: Performed by: STUDENT IN AN ORGANIZED HEALTH CARE EDUCATION/TRAINING PROGRAM

## 2022-08-18 PROCEDURE — 272N000001 HC OR GENERAL SUPPLY STERILE: Performed by: OBSTETRICS & GYNECOLOGY

## 2022-08-18 PROCEDURE — C1765 ADHESION BARRIER: HCPCS | Performed by: OBSTETRICS & GYNECOLOGY

## 2022-08-18 PROCEDURE — 85027 COMPLETE CBC AUTOMATED: CPT

## 2022-08-18 PROCEDURE — 250N000011 HC RX IP 250 OP 636: Performed by: STUDENT IN AN ORGANIZED HEALTH CARE EDUCATION/TRAINING PROGRAM

## 2022-08-18 PROCEDURE — 258N000003 HC RX IP 258 OP 636

## 2022-08-18 PROCEDURE — 250N000013 HC RX MED GY IP 250 OP 250 PS 637

## 2022-08-18 PROCEDURE — 360N000076 HC SURGERY LEVEL 3, PER MIN: Performed by: OBSTETRICS & GYNECOLOGY

## 2022-08-18 PROCEDURE — 59510 CESAREAN DELIVERY: CPT | Mod: GC | Performed by: OBSTETRICS & GYNECOLOGY

## 2022-08-18 PROCEDURE — 82565 ASSAY OF CREATININE: CPT

## 2022-08-18 RX ORDER — DIPHENHYDRAMINE HYDROCHLORIDE 50 MG/ML
25 INJECTION INTRAMUSCULAR; INTRAVENOUS EVERY 6 HOURS PRN
Status: DISCONTINUED | OUTPATIENT
Start: 2022-08-18 | End: 2022-08-20 | Stop reason: HOSPADM

## 2022-08-18 RX ORDER — METHYLERGONOVINE MALEATE 0.2 MG/ML
200 INJECTION INTRAVENOUS
Status: DISCONTINUED | OUTPATIENT
Start: 2022-08-18 | End: 2022-08-18 | Stop reason: HOSPADM

## 2022-08-18 RX ORDER — METOCLOPRAMIDE HYDROCHLORIDE 5 MG/ML
10 INJECTION INTRAMUSCULAR; INTRAVENOUS EVERY 6 HOURS PRN
Status: DISCONTINUED | OUTPATIENT
Start: 2022-08-18 | End: 2022-08-20 | Stop reason: HOSPADM

## 2022-08-18 RX ORDER — FENTANYL CITRATE-0.9 % NACL/PF 10 MCG/ML
100 PLASTIC BAG, INJECTION (ML) INTRAVENOUS EVERY 5 MIN PRN
Status: DISCONTINUED | OUTPATIENT
Start: 2022-08-18 | End: 2022-08-18

## 2022-08-18 RX ORDER — HYDROCORTISONE 25 MG/G
CREAM TOPICAL 3 TIMES DAILY PRN
Status: DISCONTINUED | OUTPATIENT
Start: 2022-08-18 | End: 2022-08-20 | Stop reason: HOSPADM

## 2022-08-18 RX ORDER — MISOPROSTOL 200 UG/1
400 TABLET ORAL
Status: DISCONTINUED | OUTPATIENT
Start: 2022-08-18 | End: 2022-08-20 | Stop reason: HOSPADM

## 2022-08-18 RX ORDER — KETOROLAC TROMETHAMINE 30 MG/ML
30 INJECTION, SOLUTION INTRAMUSCULAR; INTRAVENOUS
Status: DISCONTINUED | OUTPATIENT
Start: 2022-08-18 | End: 2022-08-20 | Stop reason: HOSPADM

## 2022-08-18 RX ORDER — OXYTOCIN/0.9 % SODIUM CHLORIDE 30/500 ML
PLASTIC BAG, INJECTION (ML) INTRAVENOUS CONTINUOUS PRN
Status: DISCONTINUED | OUTPATIENT
Start: 2022-08-18 | End: 2022-08-18

## 2022-08-18 RX ORDER — DEXTROSE, SODIUM CHLORIDE, SODIUM LACTATE, POTASSIUM CHLORIDE, AND CALCIUM CHLORIDE 5; .6; .31; .03; .02 G/100ML; G/100ML; G/100ML; G/100ML; G/100ML
INJECTION, SOLUTION INTRAVENOUS CONTINUOUS
Status: DISCONTINUED | OUTPATIENT
Start: 2022-08-18 | End: 2022-08-20 | Stop reason: HOSPADM

## 2022-08-18 RX ORDER — CEFAZOLIN SODIUM/WATER 2 G/20 ML
SYRINGE (ML) INTRAVENOUS
Status: COMPLETED
Start: 2022-08-18 | End: 2022-08-18

## 2022-08-18 RX ORDER — PROCHLORPERAZINE MALEATE 10 MG
10 TABLET ORAL EVERY 6 HOURS PRN
Status: DISCONTINUED | OUTPATIENT
Start: 2022-08-18 | End: 2022-08-20 | Stop reason: HOSPADM

## 2022-08-18 RX ORDER — IBUPROFEN 800 MG/1
800 TABLET, FILM COATED ORAL EVERY 6 HOURS
Status: DISCONTINUED | OUTPATIENT
Start: 2022-08-19 | End: 2022-08-20 | Stop reason: HOSPADM

## 2022-08-18 RX ORDER — MISOPROSTOL 200 UG/1
800 TABLET ORAL
Status: DISCONTINUED | OUTPATIENT
Start: 2022-08-18 | End: 2022-08-20 | Stop reason: HOSPADM

## 2022-08-18 RX ORDER — LIDOCAINE 40 MG/G
CREAM TOPICAL
Status: DISCONTINUED | OUTPATIENT
Start: 2022-08-18 | End: 2022-08-20 | Stop reason: HOSPADM

## 2022-08-18 RX ORDER — TRANEXAMIC ACID 10 MG/ML
1 INJECTION, SOLUTION INTRAVENOUS EVERY 30 MIN PRN
Status: DISCONTINUED | OUTPATIENT
Start: 2022-08-18 | End: 2022-08-18 | Stop reason: HOSPADM

## 2022-08-18 RX ORDER — ACETAMINOPHEN 325 MG/1
975 TABLET ORAL EVERY 6 HOURS
Status: DISCONTINUED | OUTPATIENT
Start: 2022-08-18 | End: 2022-08-20 | Stop reason: HOSPADM

## 2022-08-18 RX ORDER — METHYLERGONOVINE MALEATE 0.2 MG/ML
200 INJECTION INTRAVENOUS
Status: DISCONTINUED | OUTPATIENT
Start: 2022-08-18 | End: 2022-08-20 | Stop reason: HOSPADM

## 2022-08-18 RX ORDER — SODIUM CHLORIDE, SODIUM LACTATE, POTASSIUM CHLORIDE, CALCIUM CHLORIDE 600; 310; 30; 20 MG/100ML; MG/100ML; MG/100ML; MG/100ML
INJECTION, SOLUTION INTRAVENOUS CONTINUOUS PRN
Status: DISCONTINUED | OUTPATIENT
Start: 2022-08-18 | End: 2022-08-18

## 2022-08-18 RX ORDER — NALBUPHINE HYDROCHLORIDE 10 MG/ML
2.5-5 INJECTION, SOLUTION INTRAMUSCULAR; INTRAVENOUS; SUBCUTANEOUS EVERY 6 HOURS PRN
Status: DISCONTINUED | OUTPATIENT
Start: 2022-08-18 | End: 2022-08-20 | Stop reason: HOSPADM

## 2022-08-18 RX ORDER — PROCHLORPERAZINE 25 MG
25 SUPPOSITORY, RECTAL RECTAL EVERY 12 HOURS PRN
Status: DISCONTINUED | OUTPATIENT
Start: 2022-08-18 | End: 2022-08-20 | Stop reason: HOSPADM

## 2022-08-18 RX ORDER — AMOXICILLIN 250 MG
2 CAPSULE ORAL 2 TIMES DAILY
Status: DISCONTINUED | OUTPATIENT
Start: 2022-08-18 | End: 2022-08-20 | Stop reason: HOSPADM

## 2022-08-18 RX ORDER — ACETAMINOPHEN 325 MG/1
975 TABLET ORAL ONCE
Status: DISCONTINUED | OUTPATIENT
Start: 2022-08-18 | End: 2022-08-18 | Stop reason: HOSPADM

## 2022-08-18 RX ORDER — MISOPROSTOL 200 UG/1
800 TABLET ORAL
Status: DISCONTINUED | OUTPATIENT
Start: 2022-08-18 | End: 2022-08-18 | Stop reason: HOSPADM

## 2022-08-18 RX ORDER — CARBOPROST TROMETHAMINE 250 UG/ML
250 INJECTION, SOLUTION INTRAMUSCULAR
Status: DISCONTINUED | OUTPATIENT
Start: 2022-08-18 | End: 2022-08-20 | Stop reason: HOSPADM

## 2022-08-18 RX ORDER — CITRIC ACID/SODIUM CITRATE 334-500MG
30 SOLUTION, ORAL ORAL ONCE
Status: DISCONTINUED | OUTPATIENT
Start: 2022-08-18 | End: 2022-08-18

## 2022-08-18 RX ORDER — CITRIC ACID/SODIUM CITRATE 334-500MG
30 SOLUTION, ORAL ORAL
Status: DISCONTINUED | OUTPATIENT
Start: 2022-08-18 | End: 2022-08-18 | Stop reason: HOSPADM

## 2022-08-18 RX ORDER — SODIUM CHLORIDE, SODIUM LACTATE, POTASSIUM CHLORIDE, CALCIUM CHLORIDE 600; 310; 30; 20 MG/100ML; MG/100ML; MG/100ML; MG/100ML
INJECTION, SOLUTION INTRAVENOUS CONTINUOUS
Status: DISCONTINUED | OUTPATIENT
Start: 2022-08-18 | End: 2022-08-18 | Stop reason: HOSPADM

## 2022-08-18 RX ORDER — OXYTOCIN/0.9 % SODIUM CHLORIDE 30/500 ML
340 PLASTIC BAG, INJECTION (ML) INTRAVENOUS CONTINUOUS PRN
Status: DISCONTINUED | OUTPATIENT
Start: 2022-08-18 | End: 2022-08-18 | Stop reason: HOSPADM

## 2022-08-18 RX ORDER — ONDANSETRON 2 MG/ML
INJECTION INTRAMUSCULAR; INTRAVENOUS PRN
Status: DISCONTINUED | OUTPATIENT
Start: 2022-08-18 | End: 2022-08-18

## 2022-08-18 RX ORDER — OXYTOCIN 10 [USP'U]/ML
10 INJECTION, SOLUTION INTRAMUSCULAR; INTRAVENOUS
Status: DISCONTINUED | OUTPATIENT
Start: 2022-08-18 | End: 2022-08-20 | Stop reason: HOSPADM

## 2022-08-18 RX ORDER — LIDOCAINE 40 MG/G
CREAM TOPICAL
Status: DISCONTINUED | OUTPATIENT
Start: 2022-08-18 | End: 2022-08-18 | Stop reason: HOSPADM

## 2022-08-18 RX ORDER — ONDANSETRON 4 MG/1
4 TABLET, ORALLY DISINTEGRATING ORAL EVERY 30 MIN PRN
Status: DISCONTINUED | OUTPATIENT
Start: 2022-08-18 | End: 2022-08-18 | Stop reason: HOSPADM

## 2022-08-18 RX ORDER — BUPIVACAINE HYDROCHLORIDE 2.5 MG/ML
INJECTION, SOLUTION EPIDURAL; INFILTRATION; INTRACAUDAL
Status: DISCONTINUED | OUTPATIENT
Start: 2022-08-18 | End: 2022-08-18

## 2022-08-18 RX ORDER — ONDANSETRON 2 MG/ML
4 INJECTION INTRAMUSCULAR; INTRAVENOUS EVERY 30 MIN PRN
Status: DISCONTINUED | OUTPATIENT
Start: 2022-08-18 | End: 2022-08-18 | Stop reason: HOSPADM

## 2022-08-18 RX ORDER — MISOPROSTOL 200 UG/1
400 TABLET ORAL
Status: DISCONTINUED | OUTPATIENT
Start: 2022-08-18 | End: 2022-08-18 | Stop reason: HOSPADM

## 2022-08-18 RX ORDER — IBUPROFEN 800 MG/1
800 TABLET, FILM COATED ORAL
Status: DISCONTINUED | OUTPATIENT
Start: 2022-08-18 | End: 2022-08-20 | Stop reason: HOSPADM

## 2022-08-18 RX ORDER — CEFAZOLIN SODIUM/WATER 2 G/20 ML
2 SYRINGE (ML) INTRAVENOUS
Status: DISCONTINUED | OUTPATIENT
Start: 2022-08-18 | End: 2022-08-18 | Stop reason: HOSPADM

## 2022-08-18 RX ORDER — OXYTOCIN/0.9 % SODIUM CHLORIDE 30/500 ML
340 PLASTIC BAG, INJECTION (ML) INTRAVENOUS CONTINUOUS PRN
Status: DISCONTINUED | OUTPATIENT
Start: 2022-08-18 | End: 2022-08-20 | Stop reason: HOSPADM

## 2022-08-18 RX ORDER — MORPHINE SULFATE 1 MG/ML
150 INJECTION, SOLUTION EPIDURAL; INTRATHECAL; INTRAVENOUS ONCE
Status: DISCONTINUED | OUTPATIENT
Start: 2022-08-18 | End: 2022-08-18

## 2022-08-18 RX ORDER — BISACODYL 10 MG
10 SUPPOSITORY, RECTAL RECTAL DAILY PRN
Status: DISCONTINUED | OUTPATIENT
Start: 2022-08-20 | End: 2022-08-20 | Stop reason: HOSPADM

## 2022-08-18 RX ORDER — FENTANYL CITRATE 50 UG/ML
15 INJECTION, SOLUTION INTRAMUSCULAR; INTRAVENOUS ONCE
Status: DISCONTINUED | OUTPATIENT
Start: 2022-08-18 | End: 2022-08-18

## 2022-08-18 RX ORDER — MODIFIED LANOLIN
OINTMENT (GRAM) TOPICAL
Status: DISCONTINUED | OUTPATIENT
Start: 2022-08-18 | End: 2022-08-20 | Stop reason: HOSPADM

## 2022-08-18 RX ORDER — SIMETHICONE 80 MG
80 TABLET,CHEWABLE ORAL 4 TIMES DAILY PRN
Status: DISCONTINUED | OUTPATIENT
Start: 2022-08-18 | End: 2022-08-20 | Stop reason: HOSPADM

## 2022-08-18 RX ORDER — TRANEXAMIC ACID 10 MG/ML
1 INJECTION, SOLUTION INTRAVENOUS EVERY 30 MIN PRN
Status: DISCONTINUED | OUTPATIENT
Start: 2022-08-18 | End: 2022-08-20 | Stop reason: HOSPADM

## 2022-08-18 RX ORDER — OXYTOCIN 10 [USP'U]/ML
10 INJECTION, SOLUTION INTRAMUSCULAR; INTRAVENOUS
Status: DISCONTINUED | OUTPATIENT
Start: 2022-08-18 | End: 2022-08-18 | Stop reason: HOSPADM

## 2022-08-18 RX ORDER — METOCLOPRAMIDE 10 MG/1
10 TABLET ORAL EVERY 6 HOURS PRN
Status: DISCONTINUED | OUTPATIENT
Start: 2022-08-18 | End: 2022-08-20 | Stop reason: HOSPADM

## 2022-08-18 RX ORDER — CALCIUM CARBONATE 750 MG/1
750 TABLET, CHEWABLE ORAL DAILY PRN
Status: DISCONTINUED | OUTPATIENT
Start: 2022-08-18 | End: 2022-08-20 | Stop reason: HOSPADM

## 2022-08-18 RX ORDER — OXYTOCIN/0.9 % SODIUM CHLORIDE 30/500 ML
100-340 PLASTIC BAG, INJECTION (ML) INTRAVENOUS CONTINUOUS PRN
Status: DISCONTINUED | OUTPATIENT
Start: 2022-08-18 | End: 2022-08-20 | Stop reason: HOSPADM

## 2022-08-18 RX ORDER — CARBOPROST TROMETHAMINE 250 UG/ML
250 INJECTION, SOLUTION INTRAMUSCULAR
Status: DISCONTINUED | OUTPATIENT
Start: 2022-08-18 | End: 2022-08-18 | Stop reason: HOSPADM

## 2022-08-18 RX ORDER — ONDANSETRON 4 MG/1
4 TABLET, ORALLY DISINTEGRATING ORAL EVERY 6 HOURS PRN
Status: DISCONTINUED | OUTPATIENT
Start: 2022-08-18 | End: 2022-08-20 | Stop reason: HOSPADM

## 2022-08-18 RX ORDER — AMOXICILLIN 250 MG
1 CAPSULE ORAL 2 TIMES DAILY
Status: DISCONTINUED | OUTPATIENT
Start: 2022-08-18 | End: 2022-08-20 | Stop reason: HOSPADM

## 2022-08-18 RX ORDER — ONDANSETRON 2 MG/ML
4 INJECTION INTRAMUSCULAR; INTRAVENOUS EVERY 6 HOURS PRN
Status: DISCONTINUED | OUTPATIENT
Start: 2022-08-18 | End: 2022-08-20 | Stop reason: HOSPADM

## 2022-08-18 RX ORDER — CEFAZOLIN SODIUM/WATER 2 G/20 ML
SYRINGE (ML) INTRAVENOUS PRN
Status: DISCONTINUED | OUTPATIENT
Start: 2022-08-18 | End: 2022-08-18

## 2022-08-18 RX ORDER — CEFAZOLIN SODIUM/WATER 2 G/20 ML
2 SYRINGE (ML) INTRAVENOUS SEE ADMIN INSTRUCTIONS
Status: DISCONTINUED | OUTPATIENT
Start: 2022-08-18 | End: 2022-08-18 | Stop reason: HOSPADM

## 2022-08-18 RX ORDER — DIPHENHYDRAMINE HCL 25 MG
25 CAPSULE ORAL EVERY 6 HOURS PRN
Status: DISCONTINUED | OUTPATIENT
Start: 2022-08-18 | End: 2022-08-20 | Stop reason: HOSPADM

## 2022-08-18 RX ORDER — KETOROLAC TROMETHAMINE 30 MG/ML
30 INJECTION, SOLUTION INTRAMUSCULAR; INTRAVENOUS EVERY 6 HOURS
Status: DISPENSED | OUTPATIENT
Start: 2022-08-18 | End: 2022-08-19

## 2022-08-18 RX ORDER — AZITHROMYCIN 500 MG/5ML
500 INJECTION, POWDER, LYOPHILIZED, FOR SOLUTION INTRAVENOUS
Status: DISCONTINUED | OUTPATIENT
Start: 2022-08-18 | End: 2022-08-18 | Stop reason: HOSPADM

## 2022-08-18 RX ORDER — OXYCODONE HYDROCHLORIDE 5 MG/1
5 TABLET ORAL EVERY 4 HOURS PRN
Status: DISCONTINUED | OUTPATIENT
Start: 2022-08-18 | End: 2022-08-20 | Stop reason: HOSPADM

## 2022-08-18 RX ADMIN — SODIUM CITRATE AND CITRIC ACID MONOHYDRATE 30 ML: 500; 334 SOLUTION ORAL at 10:15

## 2022-08-18 RX ADMIN — MISOPROSTOL 800 MCG: 200 TABLET ORAL at 12:00

## 2022-08-18 RX ADMIN — TRANEXAMIC ACID 1 G: 10 INJECTION, SOLUTION INTRAVENOUS at 11:34

## 2022-08-18 RX ADMIN — SODIUM CHLORIDE, POTASSIUM CHLORIDE, SODIUM LACTATE AND CALCIUM CHLORIDE: 600; 310; 30; 20 INJECTION, SOLUTION INTRAVENOUS at 04:35

## 2022-08-18 RX ADMIN — Medication: at 00:57

## 2022-08-18 RX ADMIN — Medication 6 ML: at 02:22

## 2022-08-18 RX ADMIN — SENNOSIDES AND DOCUSATE SODIUM 2 TABLET: 50; 8.6 TABLET ORAL at 22:24

## 2022-08-18 RX ADMIN — Medication 2 G: at 10:28

## 2022-08-18 RX ADMIN — ONDANSETRON 4 MG: 2 INJECTION INTRAMUSCULAR; INTRAVENOUS at 10:29

## 2022-08-18 RX ADMIN — BUPIVACAINE 20 ML: 13.3 INJECTION, SUSPENSION, LIPOSOMAL INFILTRATION at 13:51

## 2022-08-18 RX ADMIN — OXYTOCIN-SODIUM CHLORIDE 0.9% IV SOLN 30 UNIT/500ML 600 ML/HR: 30-0.9/5 SOLUTION at 11:13

## 2022-08-18 RX ADMIN — ACETAMINOPHEN 975 MG: 325 TABLET, FILM COATED ORAL at 22:24

## 2022-08-18 RX ADMIN — METHYLERGONOVINE MALEATE 200 MCG: 0.2 INJECTION INTRAVENOUS at 11:17

## 2022-08-18 RX ADMIN — BUPIVACAINE HYDROCHLORIDE 20 ML: 2.5 INJECTION, SOLUTION EPIDURAL; INFILTRATION; INTRACAUDAL at 13:51

## 2022-08-18 RX ADMIN — AZITHROMYCIN MONOHYDRATE 500 MG: 500 INJECTION, POWDER, LYOPHILIZED, FOR SOLUTION INTRAVENOUS at 10:36

## 2022-08-18 RX ADMIN — SODIUM CHLORIDE, SODIUM LACTATE, POTASSIUM CHLORIDE, CALCIUM CHLORIDE AND DEXTROSE MONOHYDRATE: 5; 600; 310; 30; 20 INJECTION, SOLUTION INTRAVENOUS at 14:11

## 2022-08-18 RX ADMIN — KETOROLAC TROMETHAMINE 30 MG: 30 INJECTION, SOLUTION INTRAMUSCULAR at 22:23

## 2022-08-18 RX ADMIN — SODIUM CHLORIDE, POTASSIUM CHLORIDE, SODIUM LACTATE AND CALCIUM CHLORIDE: 600; 310; 30; 20 INJECTION, SOLUTION INTRAVENOUS at 10:19

## 2022-08-18 RX ADMIN — KETOROLAC TROMETHAMINE 30 MG: 30 INJECTION, SOLUTION INTRAMUSCULAR at 14:35

## 2022-08-18 RX ADMIN — CALCIUM CARBONATE 750 MG: 750 TABLET ORAL at 03:41

## 2022-08-18 RX ADMIN — PHENYLEPHRINE HYDROCHLORIDE 50 MCG/MIN: 10 INJECTION INTRAVENOUS at 10:39

## 2022-08-18 ASSESSMENT — ACTIVITIES OF DAILY LIVING (ADL)
ADLS_ACUITY_SCORE: 20

## 2022-08-18 NOTE — PROVIDER NOTIFICATION
08/18/22 0730   Provider Notification   Provider Name/Title G2   Method of Notification Electronic Page   Notification Reason SVE   The patient is requesting to be checked and is hoping it is time to push

## 2022-08-18 NOTE — PLAN OF CARE
Data: Lizabeth Dye transferred to [facility] via wheelchair at 1450. Baby transferred via parent's arms.  Action: Receiving unit notified of transfer: Yes. Patient and family notified of room change. Report given to Brad KEARNS at 1515. Belongings sent to receiving unit. Accompanied by Registered Nurse. Oriented patient to surroundings. Call light within reach. ID bands double-checked with receiving RN.  Response: Patient tolerated transfer and is stable.

## 2022-08-18 NOTE — PROGRESS NOTES
[facility]  Labor Progress Note    S:  Continues to be very uncomfortable. Anesthesia notified.   O:   Patient Vitals for the past 4 hrs:   BP Temp Temp src Resp SpO2   22 0158 -- 97.8  F (36.6  C) Oral -- --   22 0150 133/82 -- -- 16 --   22 0119 123/69 98  F (36.7  C) Oral -- --   22 0049 129/63 -- -- 16 --   22 0030 -- 98  F (36.7  C) Oral -- --   22 0018 116/60 -- -- 16 --   22 0013 123/64 -- -- 16 --   22 0009 119/69 -- -- 16 --   22 0006 -- -- -- 16 100 %   22 0004 119/76 -- -- 16 --   22 100/72 -- -- 16 --   22 117/71 -- -- 16 --   22 2349 133/79 -- -- 16 --   22 2344 108/75 -- -- 16 --   22 2337 116/62 -- -- 16 --   22 2335 124/69 -- -- 16 --   22 2333 126/77 -- -- 16 --   22 2331 131/82 -- -- 16 100 %   22 128/73 -- -- 16 --   22 232 128/72 -- -- 16 --   22 232 (!) 141/82 -- -- 16 --   22 2323 131/80 -- -- 16 --   22 2321 135/82 98  F (36.7  C) -- 16 --   220 -- 97.8  F (36.6  C) Oral 18 --        Gen: Comfortable in bed  SVE: 8/100/0, per G2 chaperoned by RN     FHT: Baseline 140, moderate variability, with current period of minimal, present accelerations, possible variable decels.   [place]: 2-3 contractions in 10 minutes      A/P:  Lizabeth Dye is a 31 year old  at 38w5d here for PROM. Pregnancy complicated by hypothyroidism.    #PROM  # IOL  - SVE: 8/0  - Augmentation: Pitocin currently at 4  - Membranes: SROM (2300 )  - Pain: Epidural in place;  Not well controlled.   - Plan: Continue to observe and titrate pitocin as needed. Anticipate     FWB:Category II with intermittent variable decels. Intermittent periods of minimal variability. IV fluid bolus given and patient has been repositioned from left side to right side. Will continue to monitor closely.     Bridget Cortés MD, PGY-2    N  Obstetrics & Gynecology Residency

## 2022-08-18 NOTE — PROGRESS NOTES
[facility]  Labor Progress Note    S:  Coping very well. In throne at this time and feels more comfortable.   O:   Patient Vitals for the past 4 hrs:   BP Temp Temp src Resp SpO2   22 0540 (!) 142/78 -- -- 16 --   22 0511 131/80 -- -- 16 --   22 0446 -- 98  F (36.7  C) Oral -- --   22 0441 137/81 -- -- 16 100 %   22 0409 (!) 141/83 -- -- 16 --   22 0339 132/81 97.8  F (36.6  C) Oral 16 --   22 0310 (!) 142/79 -- -- 16 100 %   22 0305 (!) 142/76 -- -- 16 100 %   22 0300 138/66 -- -- 16 99 %   22 0255 132/83 -- -- 16 99 %   22 0250 136/77 -- -- 16 100 %   22 0245 126/80 97.8  F (36.6  C) Oral 16 100 %   22 0240 123/79 -- -- 16 100 %   22 0235 127/82 -- -- 16 93 %   22 0229 124/79 -- -- 16 --   22 0227 131/81 -- -- 16 --   22 0225 134/86 -- -- 16 --   22 0221 129/82 -- -- 16 100 %   22 0219 134/80 -- -- 16 --        Gen: In throne position, breathing well through contractions   SVE: ant/100/+1, per G2 chaperoned by RN     FHT: Baseline 130, moderate variability, present accelerations, Absent decels  [place]: 3-4 contractions in 10 minutes      A/P:  Lizabeth Dye is a 31 year old  at 38w5d here for PROM. Pregnancy complicated by hypothyroidism.    #PROM  # IOL  - SVE: ant/100/+1  - Augmentation: Pitocin currently at 10  - Membranes: SROM (2300 )  - Pain: Epidural in place; s/p 2 boluses   - Plan: Continue to observe and titrate pitocin as needed. Can consider IUPC to assess contraction adequacy. Anticipate     #gHTN  - met criteria intrapartum with mild range pressures  - No s/s preeclampsia  - HELLP labs wnl. Repeat, PRN  - Will continue to monitor.     FWB: Currently Category I. Overall reactive and reassuring.      Bridget Cortés MD, PGY-2    [facility] Obstetrics & Gynecology Residency

## 2022-08-18 NOTE — PLAN OF CARE
VSS. Epidural placed at 1630 and tolerating well. Pitocin started at 2030 per orders. Pt rested and had clear liquid meal.  Repositioned frequently with peanut ball as tolerated. FHR appropriate for gestational age, see flow sheets for details. SVE was 3/80/0 at 2000. Will continue with current care plan.

## 2022-08-18 NOTE — PROGRESS NOTES
[facility]  Labor Progress Note    S:  Pushing in upright position with improving effort, responding well to coaching.     O:   Patient Vitals for the past 4 hrs:   BP Temp Temp src Resp SpO2   22 0730 -- 97.8  F (36.6  C) Oral 18 99 %   22 0640 133/73 -- -- 16 --   22 0638 -- 97.8  F (36.6  C) Oral -- --   22 0610 132/79 -- -- 16 --   22 0540 (!) 142/78 -- -- 16 --   22 0526 -- 98.2  F (36.8  C) Oral -- --   22 0511 131/80 -- -- 16 --   22 0446 -- 98  F (36.7  C) Oral -- --   22 0441 137/81 -- -- 16 100 %        Gen: In throne position, breathing well through contractions   SVE: 10/100/+1 (0749) per staff    FHT: Baseline 135, moderate variability, present accelerations, Absent decels  [place]:  4 contractions in 10 minutes      A/P:  Lizabeth Dye is a 31 year old  at 38w6d here for PROM. Pregnancy complicated by hypothyroidism.    #PROM  # IOL  - SVE: complete, pushing  - Augmentation: Pitocin currently at 10  - Membranes: SROM (2300 )  - Pain: Epidural in place; s/p 2 boluses - incomplete relief but declines replacement  - Plan: Continue to observe and titrate pitocin as needed. Anticipate     #gHTN  - met criteria intrapartum with mild range pressures  - No s/s preeclampsia  - HELLP labs wnl. Repeat, PRN  - Will continue to monitor.     FWB: Currently Category I. Overall reactive and reassuring.      Lizabeth Khan MD, PGY-2  8:26 AM  [facility] Obstetrics & Gynecology Residency

## 2022-08-18 NOTE — PROGRESS NOTES
Labor progress Note     Patient reassessed and still pushing with good effort but no significant descent since her last exam ~ 1 hour ago. We discussed that she has been pushing with good effort for two hours and only minimal descent has been noted. We reviewed the options to continue pushing for an additional 1-2 hours as long as fetal status remains reassuring and patient able to push vs considering a  section or operative vaginal delivery. She does not think she can continue pushing effectively at this point. We discussed that she is not a good candidate for OVD due to concern for CPD given arrest of descent with minimal progress and slow progress in the active stage of labor as well as higher fetal station and reviewed that if a OVD is not successful that we would then need to proceed with a  but the risks of complications is higher in the setting of a failed OVD.     Based on this, I recommend proceeding with a  section for safe delivery. Risks and benefits of surgery discussed with patient who wishes to proceed. Pt counseled on risks of bleeding, infection, damage to surrounding structures (bowel, bladder, ureters, nerves, blood vessels, infant) necessitating further surgery. Pt further counseled regarding surgical and medical management of uterine hemorrhage. Pt also understands that in a life threatening situation an emergency hysterectomy may be performed. She does accept blood products. Questions answered to the patient and her family's apparent satisfaction. Consent form signed for  section.    Tete Jason MD

## 2022-08-18 NOTE — PROGRESS NOTES
[facility]  Labor Progress Note    S:  Patient in throne position and feeling slightly more comfortable  O:   Patient Vitals for the past 4 hrs:   BP Temp Temp src Resp SpO2   22 0310 (!) 142/79 -- -- 16 100 %   22 0305 (!) 142/76 -- -- 16 100 %   22 0300 138/66 -- -- 16 99 %   22 0255 132/83 -- -- 16 99 %   22 0250 136/77 -- -- 16 100 %   22 0245 126/80 97.8  F (36.6  C) Oral 16 100 %   22 0240 123/79 -- -- 16 100 %   22 0235 127/82 -- -- 16 93 %   22 0229 124/79 -- -- 16 --   22 0227 131/81 -- -- 16 --   22 0225 134/86 -- -- 16 --   22 0221 129/82 -- -- 16 100 %   22 0219 134/80 -- -- 16 --   22 0158 -- 97.8  F (36.6  C) Oral -- --   22 0150 133/82 -- -- 16 --   22 0119 123/69 98  F (36.7  C) Oral -- --   22 0049 129/63 -- -- 16 --   22 0030 -- 98  F (36.7  C) Oral -- --   22 0018 116/60 -- -- 16 --   22 0013 123/64 -- -- 16 --   22 0009 119/69 -- -- 16 --   22 0006 -- -- -- 16 100 %   22 0004 119/76 -- -- 16 --   22 2358 100/72 -- -- 16 --        Gen: In throne position, still very uncomfortable with contractions   SVE: /0, per G2 chaperoned by RN     FHT: Baseline 130, moderate variability, present accelerations, intermittent variable decels.   [place]: 2-3 contractions in 10 minutes      A/P:  Lizabeth Dye is a 31 year old  at 38w5d here for PROM. Pregnancy complicated by hypothyroidism.    #PROM  # IOL  - SVE: /0  - Augmentation: Pitocin currently at 6  - Membranes: SROM (2300 /)  - Pain: Epidural in place; s/p 2 boluses  - Plan: Continue to observe and titrate pitocin as needed. Can consider IUPC to assess contraction adequacy. Anticipate     FWB: Overall Category I with intermittent variable decels. Overall reactive and reassuring.      Bridget Cortés MD, PGY-2    [facility] Obstetrics & Gynecology Residency               Patient called at  [phone]  Needs Stateless interrupter

## 2022-08-18 NOTE — PROGRESS NOTES
Patient arrived to [facility] unit via zoom cart at 1450,with belongings, accompanied by spouse/ significant other, with infant in arms. Received report from Madison GARZA and checked bands. Unit and room orientation completd. Call light given; no concerns present at this time. Continue with plan of care.

## 2022-08-18 NOTE — PROGRESS NOTES
[facility]  Labor Progress Note    S:  Patient very uncomfortable and is requesting cervical examination. Feeling more pressure in her bottom.     O:   Patient Vitals for the past 4 hrs:   BP Temp Temp src Resp SpO2   22 2321 135/82 98  F (36.7  C) -- -- --   22 -- 97.8  F (36.6  C) Oral 18 --   22 108/62 98  F (36.7  C) Oral 18 --   22 129/84 98.4  F (36.9  C) Oral 18 100 %   22 -- 98.5  F (36.9  C) Oral -- --        Gen: Comfortable in bed  SVE: 8/100/0, per G2 chaperoned by RN     FHT: Baseline 140, moderate variability, with current period of minimal, present accelerations, possible variable decels.   [place]: 2-3 contractions in 10 minutes      A/P:  Lizabeth Dye is a 31 year old  at 38w5d here for PROM. Pregnancy complicated by hypothyroidism.    #PROM  # IOL  - SVE: 6/100/0>8/100/0  - Augmentation: Will continue pitocin at 2 units  - Membranes: SROM (2300 )  - Pain: Epidural in place  - Plan: Continue to observe and titrate pitocin as needed. Anticipate     FWB: Overall Category I; reactive and reassuring    Bridget Cortés MD, PGY-2    [facility] Obstetrics & Gynecology Residency

## 2022-08-18 NOTE — ANESTHESIA POSTPROCEDURE EVALUATION
Patient: Lizabeth Dye    Procedure: Procedure(s):   SECTION       Anesthesia Type:  Epidural    Note:  Disposition: Admission   Postop Pain Control: Uneventful            Sign Out: Well controlled pain   PONV: No   Neuro/Psych: Uneventful            Sign Out: Acceptable/Baseline neuro status   Airway/Respiratory: Uneventful            Sign Out: Acceptable/Baseline resp. status   CV/Hemodynamics: Uneventful            Sign Out: Acceptable CV status   Other NRE: NONE   DID A NON-ROUTINE EVENT OCCUR? No           Last vitals:  Vitals Value Taken Time   /75 22 1416   Temp 37.1  C (98.8  F) 22 1245   Pulse 99 22 1345   Resp 18 22 1416   SpO2 98 % 22 1416       Electronically Signed By: Fang Ospina MD  2022  3:06 PM

## 2022-08-18 NOTE — PLAN OF CARE
VSS. Coping with labor, epidural provides some pain relief. Anesthesia rebolused 2x. Per pt, epidural seems to only work right after the bolus. Changing positions frequently. Had very little sleep overnight. Ctx are 2-3 minutes. Continue to adjust pit per protocol. Anticipate . FHR has moderate variability with accels. Appropriate for gestational age. Pt has no questions or concerns at this time.

## 2022-08-18 NOTE — ANESTHESIA PROCEDURE NOTES
TAP Procedure Note    Pre-Procedure   Staff -        Anesthesiologist:  Fang Ospina MD       Resident/Fellow: David Pena MD       Performed By: resident       Location: OB       Pre-Anesthestic Checklist: patient identified, IV checked, site marked, risks and benefits discussed, informed consent, monitors and equipment checked, pre-op evaluation, at physician/surgeon's request and post-op pain management  Timeout:       Correct Patient: Yes        Correct Procedure: Yes        Correct Site: Yes        Correct Position: Yes        Correct Laterality: Yes        Site Marked: Yes  Procedure Documentation  Procedure: TAP       Diagnosis: POST OPERATIVE PAIN       Laterality: bilateral       Patient Position: supine       Patient Prep/Sterile Barriers: sterile gloves, mask       Skin prep: Chloraprep       Needle Type: short bevel       Needle Gauge: 21.        Needle Length (millimeters): 110        Ultrasound guided       1. Ultrasound was used to identify targeted nerve, plexus, vascular marker, or fascial plane and place a needle adjacent to it in real-time.       2. Ultrasound was used to visualize the spread of anesthetic in close proximity to the above referenced structure.       3. A permanent image is entered into the patient's record.    Assessment/Narrative         The placement was negative for: blood aspirated, painful injection and site bleeding       Paresthesias: No.       Bolus given via needle..        Secured via.        Insertion/Infusion Method: Single Shot       Complications: none       Injection made incrementally with aspirations every 5 mL.    Medication(s) Administered   Bupivacaine 0.25% PF (Infiltration) - Infiltration   20 mL - 8/18/2022 1:51:00 PM  Bupivacaine liposome (Exparel) 1.3% LA inj susp (Infiltration) - Infiltration   20 mL - 8/18/2022 1:51:00 PM

## 2022-08-18 NOTE — PROGRESS NOTES
[facility]  Labor Progress Note    S:  Patient very uncomfortable and is requesting cervical check.     O:   Patient Vitals for the past 4 hrs:   BP Temp Temp src Resp SpO2   22 -- 97.8  F (36.6  C) Oral 18 --   22 108/62 98  F (36.7  C) Oral 18 --   22 129/84 98.4  F (36.9  C) Oral 18 100 %   22 -- 98.5  F (36.9  C) Oral -- --        Gen: Comfortable in bed  SVE: 6-100/0, per G2 chaperoned by RN    FHT: Baseline 125, moderate variability, with current period of minimal, present accelerations, no decelerations  [place]: 2-3 contractions in 10 minutes      A/P:  Lizabeth Dye is a 31 year old  at 38w5d here for PROM. Pregnancy complicated by hypothyroidism.    #PROM  # IOL  - SVE: /0  - Augmentation: Will now continue pitocin.   - Membranes: SROM (2300 )  - Pain: Epidural in place  - Plan: Continue to observe and titrate pitocin as needed. Anticipate     FWB: Category I; overall reactive and reassuring    Bridget Cortés MD, PGY-2    [facility] Obstetrics & Gynecology Residency

## 2022-08-18 NOTE — PROGRESS NOTES
[facility]  Labor Progress Note    S:  Patient very comfortable with epidural.     O:   Patient Vitals for the past 4 hrs:   BP Temp Temp src Resp SpO2   22 1848 116/74 98.4  F (36.9  C) Oral 18 --   22 1825 112/69 -- -- -- --   22 1755 116/71 -- -- -- --   22 1724 117/70 -- -- -- --   22 1719 119/66 -- -- -- --   22 1715 113/66 -- -- -- --   22 1709 115/66 -- -- -- 98 %   22 170 117/67 -- -- (P) 16 (P) 98 %   22 113/61 -- -- (P) 16 (P) 98 %   22 108/62 -- -- (P) 16 (P) 98 %   22 111/60 -- -- (P) 16 (P) 98 %   22 1644 120/62 -- -- (P) 16 (P) 97 %   22 1639 125/61 -- -- (P) 16 (P) 98 %   22 1634 122/63 -- -- (P) 16 (P) 98 %   22 1630 118/64 -- -- -- (P) 99 %   22 1624 120/78 -- -- (P) 16 (P) 99 %   22 1619 122/76 -- -- (P) 16 (P) 99 %   22 1614 117/72 -- -- (P) 16 (P) 99 %   22 1609 121/71 -- -- (P) 16 (P) 99 %   22 1604 118/74 -- -- -- (P) 99 %        Gen: Comfortable in bed  SVE: 380/0 (1230), per G2 chaperoned by RN    FHT: Baseline 125, moderate variability, with current period of minimal, present accelerations, no decelerations  [place]: 2-3 contractions in 10 minutes        A/P:  Lizabeth Dye is a 31 year old  at 38w5d here for PROM. Pregnancy complicated by hypothyroidism.    #PROM  # IOL  - SVE: /-2 (1230)>3/80/0  - Augmentation: Will now start pitocin.   - Membranes: SROM (2300 )  - Pain: Epidural in place  - Plan: Continue to observe and titrate pitocin as needed.    FWB:  period of minimal variability immediately currently, however overall Category I, reactive and reassuring FHT    Bridget Cortés MD, PGY-2    N Obstetrics & Gynecology Residency

## 2022-08-18 NOTE — BRIEF OP NOTE
[facility]   Brief Operative Note     Surgery Date: 2022    Surgeon:  Dr. Tete Jason MD    Assistants:  Lizabeth Khan MD, PGY-2    Ray Taveras MS3    Pre-op Diagnosis:    - Intrauterine pregnancy at 38w6d  - Hypothyroid  - Rh negative  - PROM  - Gestational hypertension (intrapartum)  - Failure to progress in second stage of labor    Post-op Diagnosis:    - Same as above, s/p below stated procedure  - Liveborn male infant     Procedure:  Primary low-transverse  section with souble layer uterine closure via Pfannenstiel skin incision    Anesthesia: Spinal    EBL:  728 mL    IVF:  800 mL crystalloid    UOP:  600 mL clear urine at the end of the case    Drains: Peña Catheter     Specimens:  Cord segment/blood, Cord Gases    Complications: None apparent    Findings:   1. No adhesions.  2. Clear amniotic fluid.  3. Liveborn male infant in cephalic presentation at 1112 on 22. Apgars 9 at 1 minute & 9 at 5 minutes. Weight pending.  4. Arterial cord pH 7.24, base deficit -4.2. Venous cord pH 7.35, base deficit -3.5.  5. Bleeding from the a right-sided extension of the hysterotomy into the broad ligament controlled with placement of an O'[place] stitch.  6. Normal uterus, fallopian tubes, and ovaries.     Disposition:  Transferred in stable condition to PACU    Lizabeth Khan MD, PGY-2  12:15 PM  [facility] Obstetrics & Gynecology Residency

## 2022-08-18 NOTE — ANESTHESIA CARE TRANSFER NOTE
Patient: Lizabeth Dye    Procedure: Procedure(s):   SECTION       Diagnosis: * No pre-op diagnosis entered *  Diagnosis Additional Information: No value filed.    Anesthesia Type:   Epidural     Note:    Oropharynx: oropharynx clear of all foreign objects and spontaneously breathing  Level of Consciousness: awake  Oxygen Supplementation: room air    Independent Airway: airway patency satisfactory and stable  Dentition: dentition unchanged  Vital Signs Stable: post-procedure vital signs reviewed and stable  Report to RN Given: handoff report given  Patient transferred to: Labor and Delivery    Handoff Report: Identifed the Patient, Identified the Reponsible Provider, Reviewed the pertinent medical history, Discussed the surgical course, Reviewed Intra-OP anesthesia mangement and issues during anesthesia, Set expectations for post-procedure period and Allowed opportunity for questions and acknowledgement of understanding      Vitals:  Vitals Value Taken Time   /92    Temp 36.7    Pulse 80    Resp 16    SpO2 99%        Electronically Signed By: JESUS Mendoza CRNA  2022  12:18 PM

## 2022-08-18 NOTE — PLAN OF CARE
Goal Outcome Evaluation:      Data: VSS and postpartum checks WNL. Patient eating and schmid in placed. Has not gotten up yet. Anxious at times. Fundus at  U U  and firm  without massage.  lochia scant and  no blood clots. Incision with liquid bandage.   Action: Patient taking Toradol and Tylenol for pain and pain tolerable. Encouraged patient to breast  feed every 2 - 3 hours and to monitor for cues to feed infant. Patient education done( education record).   Response: Patient participating in infant's care. Positive attachment with infant observed.  Support/ spouse present at bedside and attentive to infant and patient.   Plan: Continue with the plan of cares.

## 2022-08-19 LAB
ABO/RH(D): NORMAL
FBST KIT EXP: NORMAL
FBST LOT #: NORMAL
FETAL BLEED SCREEN: NORMAL
HGB BLD-MCNC: 9.6 G/DL (ref 11.7–15.7)
HOLD SPECIMEN: NORMAL
SPECIMEN EXPIRATION DATE: NORMAL

## 2022-08-19 PROCEDURE — 120N000002 HC R&B MED SURG/OB UMMC

## 2022-08-19 PROCEDURE — 86901 BLOOD TYPING SEROLOGIC RH(D): CPT

## 2022-08-19 PROCEDURE — 250N000013 HC RX MED GY IP 250 OP 250 PS 637

## 2022-08-19 PROCEDURE — 36415 COLL VENOUS BLD VENIPUNCTURE: CPT

## 2022-08-19 PROCEDURE — 85018 HEMOGLOBIN: CPT

## 2022-08-19 PROCEDURE — 85461 HEMOGLOBIN FETAL: CPT

## 2022-08-19 RX ADMIN — ACETAMINOPHEN 975 MG: 325 TABLET, FILM COATED ORAL at 13:12

## 2022-08-19 RX ADMIN — ACETAMINOPHEN 975 MG: 325 TABLET, FILM COATED ORAL at 06:38

## 2022-08-19 RX ADMIN — ACETAMINOPHEN 975 MG: 325 TABLET, FILM COATED ORAL at 18:44

## 2022-08-19 RX ADMIN — LEVOTHYROXINE SODIUM 125 MCG: 125 TABLET ORAL at 06:40

## 2022-08-19 RX ADMIN — SIMETHICONE 80 MG: 80 TABLET, CHEWABLE ORAL at 13:12

## 2022-08-19 RX ADMIN — SENNOSIDES AND DOCUSATE SODIUM 2 TABLET: 50; 8.6 TABLET ORAL at 20:32

## 2022-08-19 RX ADMIN — IBUPROFEN 800 MG: 800 TABLET, FILM COATED ORAL at 13:13

## 2022-08-19 RX ADMIN — PRENATAL VITAMINS-IRON FUMARATE 27 MG IRON-FOLIC ACID 0.8 MG TABLET 1 TABLET: at 13:13

## 2022-08-19 RX ADMIN — IBUPROFEN 800 MG: 800 TABLET, FILM COATED ORAL at 06:40

## 2022-08-19 RX ADMIN — SENNOSIDES AND DOCUSATE SODIUM 2 TABLET: 50; 8.6 TABLET ORAL at 13:12

## 2022-08-19 RX ADMIN — IBUPROFEN 800 MG: 800 TABLET, FILM COATED ORAL at 18:43

## 2022-08-19 ASSESSMENT — ACTIVITIES OF DAILY LIVING (ADL)
ADLS_ACUITY_SCORE: 20

## 2022-08-19 NOTE — OP NOTE
[facility]   Delivery Full Operative Note     Surgery Date:  2022    Surgeon:           Dr. Tete Jason MD     Assistants: Lizabeth Khan MD, PGY-2                         Ray Taveras MS3     Pre-op Diagnosis:           - Intrauterine pregnancy at 38w6d  - Hypothyroid  - Rh negative  - PROM  - Gestational hypertension (intrapartum)  - Failure to progress in second stage of labor     Post-op Diagnosis:         - Same as above, s/p below stated procedure  - Liveborn male infant      Procedure:        Primary low-transverse  section with souble layer uterine closure via Pfannenstiel skin incision     Anesthesia:      Spinal     EBL:     728 mL     IVF:       800 mL crystalloid     UOP:    600 mL clear urine at the end of the case     Drains: Peña Catheter      Specimens:      Cord segment/blood, Cord Gases     Complications:             None apparent    Indications:   Lizabeth Dye is a 31 year old  at 38w6d admitted for PROM. Induction was recommended to reduce risks of prolonged ROM. Labor course included cervical ripening with PO misoprostol and augmentation with IV pitocin. She progressed to complete and pushed for over two hours without appreciable descent of the fetal head. In the setting of this abnormal progress and maternal exhaustion,  delivery was recommended. The risks, benefits, and alternatives of  section were discussed with the patient, and she agreed to proceed. She had used an epidural for pain control but due to inadequate coverage, this was removed with a plan to use spinal anesthesia for surgery.     Findings:   1. No adhesions.  2. Clear amniotic fluid.  3. Liveborn male infant in cephalic presentation at 1112 on 22. Apgars 9 at 1 minute & 9 at 5 minutes. Weight pending.  4. Arterial cord pH 7.24, base deficit -4.2. Venous cord pH 7.35, base deficit -3.5.  5. Bleeding from the a right-sided extension of the  hysterotomy into the broad ligament controlled with placement of an O'[place] stitch.  6. Normal uterus, fallopian tubes, and ovaries.     Procedure Details:   The patient was brought to the OR, where adequate spinal anesthesia was administered. She was placed in the dorsal supine position with a slight leftward tilt. She was prepped and draped in the usual sterile fashion. A surgical time out was performed. A Pfannenstiel skin incision was made with the scalpel, and carried down to the underlying fascia with sharp and blunt dissection. The fascia was incised in the midline, and the incision was extended with a combination of blunt and sharp dissection. The superior aspect of the fascia was grasped with the Kocher clamps and dissected off of the underlying rectus muscles with blunt and sharp dissection. Attention was then turned to the inferior aspect of the fascia, which was similarly dissected off of the underlying rectus muscles. The rectus muscles were  in the midline, and the peritoneum was entered bluntly, and the opening was extended with digital pressure and electrocautery. The bladder blade was placed. The position of the vesicouterine peritoneum was noted. A transverse hysterotomy was made with the scalpel in the lower uterine segment, and the incision was extended with digital pressure. The infant was noted to be in cephalic position with a deeply impacted head requiring two attempts at extraction, but was ultimately delivered atraumatically. The shoulders delivered easily.  No nuchal cord was noted. The cord was doubly clamped and cut, and the infant was handed off to the awaiting nursery staff. A segment of cord was cut and sent for gases. The placenta was delivered with gentle traction on the umbilical cord and uterine massage. The uterus was exteriorized and cleared of all clots and debris. Uterine tone was noted to be initially somewhat boggy but this improved with administration IM methergine  x1, pitocin given through the running IV, and uterine massage (IV TXA, along with MS misoprostol at the conclusion of the case, were also administered to prevent future bleeding). The hysterotomy was closed with a running locked suture of 0-Vicryl. The hysterotomy was then imbricated using a combination of a horizontal and vertical approach using an 0-Vicryl suture. Due to ongoing bleeding from an extension of hysterotomy into the right broad ligament, a right O'Rolla stitch was placed using 3-0 Vicryl suture on a CTX needle. The hysterotomy was then noted to be hemostatic. The posterior cul-de-sac was irrigated and cleared of all clots and debris. The uterus was returned to the abdomen. The hysterotomy was reexamined and noted to be hemostatic. The fascia and rectus muscles were examined and areas of oozing were controlled with electrocautery. The fascia was closed with a running 0-Vicryl suture. The subcutaneous tissue was irrigated and areas of oozing were controlled with electrocautery. The subcutaneous tissue was approximately 2 cm in thickness, and was therefore closed in a running fashion using 2-0 plain gut suture. The skin was closed with 4-0 Monocryl in a running subcuticular fashion and covered with skin glue.    All sponge, needle, and instrument counts were correct. A surgical debrief was performed. The patient tolerated the procedure well, and was transferred to recovery in stable condition. Dr. Jason was present and scrubbed for the entirety of the procedure.     Lizabeth Khan MD, PGY-2  7:00 PM  [facility] Obstetrics & Gynecology Residency    Ob/GYN Procedure Attestation     I was scrubbed and present for the entire procedure and agree with the resident documentation.     Tete Jason MD

## 2022-08-19 NOTE — DISCHARGE SUMMARY
[facility] Discharge Summary    Lizabeth Dye MRN# [id number]   Age: 31 year old YOB: 1990     Date of Admission:  2022  Date of Discharge::  22  Admitting Physician:  Saira Gerber MD  Discharge Physician:  Saira Gerber MD             Admission Diagnoses:   -   - IUP at 38w6d  - hypothyroidism  - PROM  - Rh neg          Discharge Diagnosis:   -   - Postpartum s/p PLTCS  - gHTN  - arrest of descent  - acute blood loss anemia            Procedures:     Procedure(s): primary low transverse  section with two layer closure via Pfannenstiel skin incision  - epidural anesthesia  - Spinal anesthesia                Medications Prior to Admission:     Medications Prior to Admission   Medication Sig Dispense Refill Last Dose     levothyroxine (SYNTHROID/LEVOTHROID) 125 MCG tablet Take 1 tablet (125 mcg) by mouth daily 90 tablet 1      Prenatal Vit-Fe Fumarate-FA (PRENATAL MULTIVITAMIN W/IRON) 27-0.8 MG tablet Take 1 tablet by mouth daily                Discharge Medications:        Review of your medicines      START taking      Dose / Directions   acetaminophen 325 MG tablet  Commonly known as: TYLENOL  Used for: S/P  section, Acute post-operative pain      Dose: 650 mg  Take 2 tablets (650 mg) by mouth every 6 hours as needed for mild pain Start after Delivery.  Quantity: 100 tablet  Refills: 0     ferrous sulfate 325 (65 Fe) MG tablet  Commonly known as: FEROSUL  Used for: S/P  section      Dose: 325 mg  Take 1 tablet (325 mg) by mouth daily (with breakfast)  Quantity: 60 tablet  Refills: 0     ibuprofen 200 MG tablet  Commonly known as: ADVIL/MOTRIN  Used for: S/P  section, Acute post-operative pain      Dose: 600 mg  Take 3 tablets (600 mg) by mouth every 6 hours as needed for mild pain  Refills: 0     oxyCODONE 5 MG tablet  Commonly known as: ROXICODONE  Used for: S/P  section, Acute post-operative pain       Dose: 5 mg  Take 1 tablet (5 mg) by mouth every 6 hours as needed for breakthrough pain or moderate to severe pain  Quantity: 4 tablet  Refills: 0     senna-docusate 8.6-50 MG tablet  Commonly known as: SENOKOT-S/PERICOLACE  Used for: S/P  section      Dose: 1 tablet  Take 1 tablet by mouth daily Start after delivery.  Quantity: 100 tablet  Refills: 0        CONTINUE these medicines which have NOT CHANGED      Dose / Directions   levothyroxine 125 MCG tablet  Commonly known as: SYNTHROID/LEVOTHROID  Used for: Hypothyroidism, unspecified type      Dose: 125 mcg  Take 1 tablet (125 mcg) by mouth daily  Quantity: 90 tablet  Refills: 1     prenatal multivitamin w/iron 27-0.8 MG tablet      Dose: 1 tablet  Take 1 tablet by mouth daily  Refills: 0           Where to get your medicines      These medications were sent to [facility] [address]  [address] [address]    Phone: [phone]     ferrous sulfate 325 (65 Fe) MG tablet     These medications were sent to "[id number] - [facility] [address] [facility] AT [facility] [address]  [address] [facility] [address]    Hours: 24-hours Phone: [phone]     acetaminophen 325 MG tablet    oxyCODONE 5 MG tablet    senna-docusate 8.6-50 MG tablet     Some of these will need a paper prescription and others can be bought over the counter. Ask your nurse if you have questions.    You don't need a prescription for these medications    ibuprofen 200 MG tablet               Consultations:   Anesthesiology          Brief Admission and Intrapartum History:   Lizabeth Dye is a 31 year old  at 38w6d admitted for PROM. Induction was recommended to reduce risks of prolonged ROM. Labor course included cervical ripening with PO misoprostol and augmentation with IV pitocin. She progressed to complete and pushed for over two hours without appreciable descent of the  fetal head. In the setting of this abnormal progress and maternal exhaustion,  delivery was recommended.        Intraoperative course   The procedure was complicated by an extension into the broad ligament requiring an O'[place] stitch.   mL.  See operative report for details.     Intraoperative findings  1. No adhesions.  2. Clear amniotic fluid.  3. Liveborn male infant in cephalic presentation at 1112 on 22. Apgars 9 at 1 minute & 9 at 5 minutes. Weight pending.  4. Arterial cord pH 7.24, base deficit -4.2. Venous cord pH 7.35, base deficit -3.5.  5. Bleeding from the a right-sided extension of the hysterotomy into the broad ligament controlled with placement of an O'[place] stitch.  6. Normal uterus, fallopian tubes, and ovaries.          Postpartum Course   The patient's hospital course was unremarkable.  She recovered as anticipated and experienced no post-operative complications. On discharge, her pain was well controlled. Vaginal bleeding was appropriate for postpartum period.  Voiding without difficulty.  Ambulating well and tolerating a normal diet without nausea or emesis.  No fever or significant wound drainage.  Breastfeeding well.  Infant is stable.  Passing flatus and has had bowel movement.      She was discharged on post-partum day #2. She declined contraception and plans to discuss further at postpartum visit.    B NEG Rhogam not indicated as baby was Rh neg.     Post-partum hemoglobin:   Hemoglobin   Date Value Ref Range Status   2022 9.6 (L) 11.7 - 15.7 g/dL Final             Discharge Instructions and Follow-Up:     Discharge diet: Regular   Discharge activity: No lifting greater than 20 lbs, pushing, pulling, or other strenuous activity for 6 weeks. Pelvic rest for 6 weeks including no sexual intercourse, tampons, or douching. No driving until you can slam on the brakes without pain or while on narcotic pain medications.    Discharge follow-up: Follow up with primary OB  for routine postpartum visit in 6 weeks and in 2 weeks for incision check.   Wound care: Keep incision clean and dry           Discharge Disposition:     Discharged to home      Luis Eduardo Camara MD  OB/GYN PGY-1  08/20/2022 10:32 AM         Physician Attestation   I, Saira Gerber MD, saw and evaluated this patient prior to discharge.  I discussed the patient with the resident/fellow and agree with plan of care as documented in the note.      I personally reviewed vital signs, medications, labs and exam.    I personally spent 20 minutes on discharge activities.    Saira Gerber MD  Date of Service (when I saw the patient): 08/20/22

## 2022-08-19 NOTE — PROGRESS NOTES
[facility]   Post-partum Progress Note    Name:  Lizabeth Dye  MRN: [id number]    S: Patient was sleeping after being up with baby who was cluster feeding. Per RN, patient doing well.     O:   Patient Vitals for the past 24 hrs:   BP Temp Temp src Pulse Resp SpO2   22 0643 106/58 98  F (36.7  C) Oral 97 16 --   22 0100 123/73 98  F (36.7  C) Oral 103 16 --   22 1932 126/68 99  F (37.2  C) Oral 109 16 100 %   22 1837 124/80 -- -- 107 16 100 %   22 1737 115/70 -- -- 94 16 --   22 1637 117/70 -- -- 105 16 --   22 1537 120/69 98.8  F (37.1  C) Oral 105 16 99 %   22 1507 130/82 -- -- 101 16 --   22 1416 125/75 -- -- -- 18 98 %   22 1400 114/78 -- -- -- 19 98 %   22 1345 125/78 -- -- 99 18 100 %   22 1315 109/80 -- -- -- 16 99 %   22 1300 106/83 -- -- -- 18 100 %   22 1245 118/44 98.8  F (37.1  C) Oral -- 16 100 %   22 1230 125/83 -- -- -- 16 100 %   22 1215 (!) 119/92 -- -- -- 18 100 %   22 0840 -- 98.1  F (36.7  C) Oral -- 20 --   22 0730 -- 97.8  F (36.6  C) Oral -- 18 99 %     Unable to examine patient this morning.     I/O last 3 completed shifts:  In: 1693.5 [I.V.:1693.5]  Out: 3092 [Urine:2350; Blood:742]     Latest Reference Range & Units 22 07:01   Hemoglobin 11.7 - 15.7 g/dL 9.6 (L)   (L): Data is abnormally low    Assessment/Plan:  Lizabeth Dye 31 year old  on POD #1 s/p PLTCS for failure to progress in the second stage of labor. Pregnancy complicated by hypothyroidism and newly diagnosed gHTN diagnosed intrapartum and Rh negative.   Postpartum management  Pain: Well-controlled with ibuprofen, tylenol, and oxycodone PRN   GI:  PRN bowel regimen, anti-emetics  : Voiding spontaneously  Hgb: 13.2>>9.6. Asymptomatic from blood loss anemia; will discharge with oral iron if <10.   Rh: Negative, Baby's blood type and screen is ordered but not yet collected.   rhogam eval pending  [place]:   Feed: Breast feeding.  BC:  Discussed recommended pregnancy spacing of 18 months.   PPx:  Encourage ambulation, IS, SCDs while confined to bed    gHTN  - Diagnosed intrapartum  - HELLP labs normal   - Blood pressures continue to be normotensive    Hypothyroid  - PA levothyroxine     Dispo: Anticipate discharge home on POD#2-3    Bridget Cortés MD  OB/GYN Resident, PGY-2    Physician Attestation   I, Shelly Paz MD, personally examined and evaluated this patient.  I discussed the patient with the resident/fellow and care team, and agree with the assessment and plan of care as documented in the note of 22.      I personally reviewed vital signs, medications and labs.    [place] findings: Lizabeth Dye is a 31 year old  POD#1 s/p PLTCS for arrest of descent, currently recovering well.  She states pain is ok.  Tolerating regular diet, voiding without issues, passing flatus, and ambulating without dizziness.  Pfannenstiel incision appears clean, dry, and intact with surgical glue in place.  She is working on breast feeding with baby.  Reports vaginal bleeding is normal.  BP normal to mild range.  Discussed if continues to recover well, possibly discharge to home tomorrow afternoon.  Of note patient is Rh negative.  Baby's blood sample for type and screen has not been collected or run.  Will await result and then run Rhogam eval.   Shelly Paz MD  Date of Service (when I saw the patient): 22

## 2022-08-19 NOTE — PROGRESS NOTES
Anesthesia Postpartum  Section with Spinal Anesthesia    Patient: Lizabeth Dye    Patient location: Postpartum floor    Chief complaint: Acute postoperative pain management s/p spinal anesthetic.    Procedure(s) Performed:  Procedure(s):   SECTION    Anesthesia type: Spinal Block and Epidural    Subjective  Resting comfortably at this time. Pain adequately controlled by PO medications. Denies pruritis, weakness, paresthesias, difficulties breathing or voiding, headache, nausea, or vomiting. She is able to ambulate and tolerates a regular diet.     Objective  Respiratory Function (RR / SpO2 / Airway Patency): Satisfactory  Cardiac Function (HR / Rhythm / BP): Satisfactory  Strength and sensation lower extremities: Normal  Site of spinal/epidural insertion: No signs of infection or inflammation    Most recent vitals  /58 (BP Location: Right arm, Patient Position: Semi-Centeno's, Cuff Size: Adult Regular)   Pulse 97   Temp 36.7  C (98  F) (Oral)   Resp 16   SpO2 100%   Breastfeeding Unknown     Assessment and plan  Lizabeth Dye is a 31 year old female  POD #1 s/p No admission procedures for hospital encounter. with intrathecal 0.75% bupivacaine (1.5mg), fentanyl (15mcg), and morphine (150mcg) and single shot TAP nerve block injections with bupivacaine 0.25% 10mL and long acting liposome bupivacaine (Exparel) 1.3% bilaterally. She is ambulating without difficulty without weakness or paresthesias. There is no evidence of adverse side effects associated with spinal or fascial plane block injections. The patient is receiving adequate incisional pain control at this time and anticipate up to 72 hours of incisional pain control. However, we further anticipate that the patient may require opioid and non-opioid analgesics for visceral and muscle pain that is not controlled with local anesthetic.      In brief summary, her postoperative analgesia is adequately controlled today. Further  interventional analgesic strategies would be of little utility at this time. Thus, we recommend proceeding with PO analgesics including staggered dosing of NSAIDs and acetaminophen with a taper of oxycodone.     Thank you for including us in the care for this patient. If there are any concerns please contact the department of anesthesia OB division [phone]    Annette Rico MD CA-2  Department of Anesthesiology  [phone]

## 2022-08-19 NOTE — PLAN OF CARE
Data: Vital signs within normal limits. Postpartum checks within normal limits - see flow record. Patient eating and drinking normally. Patient able to empty bladder independently and is up ambulating. No apparent signs of infection. Incision healing well. Patient performing self cares and is able to care for infant.  Patient is breastfeeding on demand.   Action: Patient medicated during the shift for pain and cramping. See MAR. Patient reassessed within 1 hour after each medication and pain was improved - patient stated she was comfortable. Patient education done about infant cares and breastfeeding. See flow record.  Response: Positive attachment behaviors observed with infant. Support persons was present and attentive to patient and infant.  Plan: Anticipate discharge.

## 2022-08-19 NOTE — PLAN OF CARE
VSS and postpartum assessments WDL.  Up ad valentino with steady gait and independent with cares.  Bonding well with infant, Ramos.  Breastfeeding on cue independently.  Pain managed with tylenol and ibuprofen.   Darrin is present and supportive.  Will continue with postpartum cares and education per plan of care.

## 2022-08-19 NOTE — PROGRESS NOTES
Patient was able to stand and ambulate to bathroom. Schmid removed and educated patient on post schmid cares and voiding. Iv saline locked and encouraged to drink more fluid.

## 2022-08-20 VITALS
HEART RATE: 80 BPM | TEMPERATURE: 97.6 F | OXYGEN SATURATION: 100 % | DIASTOLIC BLOOD PRESSURE: 79 MMHG | RESPIRATION RATE: 16 BRPM | SYSTOLIC BLOOD PRESSURE: 128 MMHG

## 2022-08-20 PROCEDURE — 250N000013 HC RX MED GY IP 250 OP 250 PS 637

## 2022-08-20 RX ORDER — OXYCODONE HYDROCHLORIDE 5 MG/1
5 TABLET ORAL EVERY 6 HOURS PRN
Qty: 4 TABLET | Refills: 0 | Status: SHIPPED | OUTPATIENT
Start: 2022-08-20 | End: 2022-09-27

## 2022-08-20 RX ORDER — ACETAMINOPHEN 325 MG/1
650 TABLET ORAL EVERY 6 HOURS PRN
Qty: 100 TABLET | Refills: 0 | Status: SHIPPED | OUTPATIENT
Start: 2022-08-20 | End: 2022-09-27

## 2022-08-20 RX ORDER — AMOXICILLIN 250 MG
1 CAPSULE ORAL DAILY
Qty: 100 TABLET | Refills: 0 | Status: SHIPPED | OUTPATIENT
Start: 2022-08-20 | End: 2022-09-27

## 2022-08-20 RX ORDER — IBUPROFEN 200 MG
600 TABLET ORAL EVERY 6 HOURS PRN
COMMUNITY
Start: 2022-08-20 | End: 2022-09-27

## 2022-08-20 RX ORDER — FERROUS SULFATE 325(65) MG
325 TABLET ORAL
Qty: 60 TABLET | Refills: 0 | Status: SHIPPED | OUTPATIENT
Start: 2022-08-20 | End: 2022-09-27

## 2022-08-20 RX ADMIN — PRENATAL VITAMINS-IRON FUMARATE 27 MG IRON-FOLIC ACID 0.8 MG TABLET 1 TABLET: at 09:46

## 2022-08-20 RX ADMIN — IBUPROFEN 800 MG: 800 TABLET, FILM COATED ORAL at 06:31

## 2022-08-20 RX ADMIN — IBUPROFEN 800 MG: 800 TABLET, FILM COATED ORAL at 12:34

## 2022-08-20 RX ADMIN — SENNOSIDES AND DOCUSATE SODIUM 2 TABLET: 50; 8.6 TABLET ORAL at 09:46

## 2022-08-20 RX ADMIN — ACETAMINOPHEN 975 MG: 325 TABLET, FILM COATED ORAL at 06:30

## 2022-08-20 RX ADMIN — ACETAMINOPHEN 975 MG: 325 TABLET, FILM COATED ORAL at 12:34

## 2022-08-20 RX ADMIN — IBUPROFEN 800 MG: 800 TABLET, FILM COATED ORAL at 01:38

## 2022-08-20 RX ADMIN — LEVOTHYROXINE SODIUM 125 MCG: 125 TABLET ORAL at 06:31

## 2022-08-20 RX ADMIN — ACETAMINOPHEN 975 MG: 325 TABLET, FILM COATED ORAL at 01:38

## 2022-08-20 ASSESSMENT — ACTIVITIES OF DAILY LIVING (ADL)
ADLS_ACUITY_SCORE: 20

## 2022-08-20 NOTE — PLAN OF CARE
Goal Outcome Evaluation: VSS. Postpartum checks WDL. Up independently, voiding without difficulty. Incision VICKIE with liquid bandage intact. Pain managed with tylenol and ibuprofen. Breastfeeding well with good latch observed.     Pt discharged to home at 1240 with  and infant son. Discharge instructions given and all questions answered. Discharge medications sent to home pharmacy- instructed on use. Pt to follow-up with provider in 6 weeks.

## 2022-08-20 NOTE — PLAN OF CARE
Goal Outcome Evaluation:      Data: VSS, postpartum assessments WNL. She is voiding without difficulty, up ad valentino, passing gas, eating and drinking normally. Incision appears to be healing well, lochia WNL, no s/s infection. Breastfeeding infant independently. Taking Tylenol and Ibuprofen for pain and using abdominal binder and hot packs. Reports good pain management.   Action: Education provided on plan of care, importance of mobility/walking, and discharge tasks.  Response: Pt is agreeable with her plan of care. Positive attachment behaviors observed with infant. Support person, spouse Darrin, present. Anticipate discharge per care plan.

## 2022-08-20 NOTE — PROGRESS NOTES
Post Partum Progress Note  POD#2    Subjective:  She is resting comfortably in bed this morning. She reports pain is somewhat more intense this morning but overall controlled on oral pain medications. She is tolerating PO intake. Lochia present and minimal.  She is voiding without difficulty. She has passed flatus and has had a BM. She is ambulating without dizziness or difficulty.  She denies headache, changes in vision, nausea/vomiting, chest pain, shortness of breath, RUQ pain, or worsening edema.  Plans to breast feed. Ready to go home today.    Objective:  Vitals:    22 1312 22 1730 22 2320 22 0628   BP: 132/83 120/72 111/68 124/76   BP Location: Left arm Left arm Left arm Left arm   Patient Position: Semi-Centeno's Semi-Centeno's Semi-Centeno's Sitting   Cuff Size: Adult Regular Adult Regular Adult Regular Adult Regular   Pulse: 89 84 88 89   Resp: 16 16 16 16   Temp:  98.4  F (36.9  C) 98  F (36.7  C) 97.7  F (36.5  C)   TempSrc:  Oral Oral Oral   SpO2:           General: NAD, resting comfortably  CV: Regular rate, well perfused.   Pulm: Normal respiratory effort.  Abd: Soft, non-tender, non-distended. Fundus is firm and below the umbilicus.    Incision: Pfannenstiel incision with skin glue is clean, dry, intact, no erythema  Ext: 2+ lower extremity edema bilaterally in feet. No calf tenderness.    Assessment/Plan:  Lizabeth Dye is a 31 year old  female who is POD#2 s/p PLTCS for failure to progress in the second stage of labor. Pregnancy complicated by hypothyroidsim and newly diagnosed gHTN diagnosed intrapartum and Rh negative.    - Encourage routine post-operative goals including ambulation and incentive spirometry  - PNC: Rh neg, infant Rh neg, Rhogam not indicated. Rubella immune.  - Pain: controlled on oral medications  - Heme: Hgb 13.2 > (mthg, TXA, CA miso) > 9.6.  Acute blood loss anemia, expected from surgery, asymptomatic. Will discharge home with iron.  - GI:  continue anti-emetics and stool softeners as needed.  - : Voiding spontaneously.  - Infant: Stable in room  - Feeding: Plans on breastfeeding.  - BC: Declines, will discuss at follow-up visit    gHTN  - Diagnosed intrapartum  - HELLP labs normal  - BP continue to be normotensive  - plan home visit for BP check    Hypothyroid  - PTA levothyroxine    Discharge to home today pending postop goals    Luis Eduardo Camara MD  OB/GYN PGY-1  2022 7:33 AM      Physician Attestation   I, Saira Gerber MD, personally examined and evaluated this patient.  I discussed the patient with the resident/fellow and care team, and agree with the assessment and plan of care as documented in the note of 22.      I personally reviewed vital signs, medications, labs and exam.    Key findings: 31 year old  on POD 2 s/p PLTCS for arrest of descent. Ready for discharge to home. Acute blood loss anemia, expected from surgery, asymptomatic, recommend PO iron. Reviewed discharge instructions including activity restrictions, pelvic rest and follow up plan. Reviewed signs of excessive bleeding, infection, postpartum pre-eclampsia, mastitis, DVT and postpartum depression - instructed patient to call if concerned about any of these or other concerns. Patient to follow up within 1 week for BP check (home health ordered), 6 weeks for routine postpartum visit. All questions answered.    Saira Gerber MD  Date of Service (when I saw the patient): 22    .

## 2022-08-20 NOTE — PLAN OF CARE
Goal Outcome Evaluation:    VSS. Pain controlled with scheduled tylenol and ibuprofen. Breastfeeding independently. Voiding without difficulty. Incision open to air with liquid bandage. No s/sx of infection noted. No drainage noted. Lochia WNL. Denies further needs at this time.

## 2022-08-26 NOTE — PATIENT INSTRUCTIONS
If you have any questions regarding your visit, Please contact your care team.     [employer]: [phone]    To Schedule an Appointment 24/7  Call: [phone]     Matt Manning MD    Medical Assistant    Pedro Bryant-Surgery Scheduler  [facility]-Surgery Scheduler     Monday-[place]  1:00 pm-4:30 pm    Tuesday-[place]  7:30 am-4:30 pm    Wednesday-[place]  7:30 am-4:30 pm        Typical Surgery Days: Monday or Thursday       [address] [address] [phone] appointment line  [phone] Fax    Imaging Scheduling all locations  [phone]    **Surgeries** Our Surgery Schedulers will contact you to schedule. If you do not receive a call within 3 business days, please call [phone].    If you need a medication refill, please contact your pharmacy. Please allow 3 business days for your refill to be completed.    As always, Thank you for trusting us with your healthcare needs!                   see additional instructions from your care team below

## 2022-08-26 NOTE — PROGRESS NOTES
Pt is seen for post C section visit.  She asked a number of questions regarding medication and wound healing.  Suggested titration off meds and use of a heating pad while feeding/pumping  Feeling well.  Discussed the course of her labor and options for future pregnancy    Incision healing well, no sign of infection, inflammation, induration    A/P  Normal 2 week visit  Follow up in one month for routing post partum check      Matt Manning MD  [facility]

## 2022-08-30 ENCOUNTER — OFFICE VISIT (OUTPATIENT)
Dept: OBGYN | Facility: OTHER | Age: 32
End: 2022-08-30
Payer: COMMERCIAL

## 2022-08-30 VITALS
DIASTOLIC BLOOD PRESSURE: 72 MMHG | BODY MASS INDEX: 25.94 KG/M2 | SYSTOLIC BLOOD PRESSURE: 120 MMHG | WEIGHT: 163.19 LBS | HEART RATE: 86 BPM

## 2022-08-30 DIAGNOSIS — Z98.890 POSTOPERATIVE STATE: Primary | ICD-10-CM

## 2022-08-30 PROCEDURE — 99024 POSTOP FOLLOW-UP VISIT: CPT | Performed by: OBSTETRICS & GYNECOLOGY

## 2022-09-01 ENCOUNTER — DOCUMENTATION ONLY (OUTPATIENT)
Dept: PEDIATRICS | Facility: CLINIC | Age: 32
End: 2022-09-01

## 2022-09-01 NOTE — PROGRESS NOTES
"[facility] Lactation Visit    Assessment:     difficulty in feeding at breast     Mirza Dye is a 2 week old old male infant born at 38 weeks and 6 days via vaginal delivery on 2022 here for lactation support.    Mirza Dye is doing well. Mirza Dye has gained 8.4 oz since their last visit 6 days ago; approximately 1.4 oz per day.  He is up 8% from birthweight. Main concern today is ensuring infant is getting enough and difficulty latching on the left side.  Infant tends to demonstrate a poor shallow latch.  This was resolved with gentle downward pressure on his chin to obtain a wider mouth gape and deep latch.  Infant was able to transfer 1.7 ounces from the breast today he recently nursed about 2 hours ago before his appointment.  He has adequate output and weight gain.  Recommended to supplement only as needed given good weight gain.  Please see feeding plan below.      Plan:    Mirza is gaining adequate weight since his last weight check. This is great!    As discussed, below the feeding recommendations for Mirza Dye:    Feeding plan: Breast-feed based on infant cues; at least 8-12 times a day. Offer both breasts as much as you are able. When latching Mirza Dye, make sure head, neck, and body are aligned an facing you. Support breast with \"sandwich\" hold or \"C\" hold while infant is breast-feeding. To obtain a deeper latch, aim the tip of the nipple to infant's roof of the mouth (aim for the nose). Ensure lips are flanged out. If having difficulty, wait for wide gape and gently apply downward pressure to the infant's chin. If latch is painful or lips are pursed in, unlatch Mirza Dye and reposition. Make sure to stimulate Mirza Dye to actively nurse. Listen for swallows. If swallows are less frequent, stimulate infant by tickling his hands or feet. You may also wipe a cool wash cloth on his face or hand " express your breast for milk. Also skin-to-skin and undressing Mirza Dye down to her diaper can be helpful. Burp Mirza Dye before switching sides and burp again after breast-feeding. Keep Mirza Dye in upright position for about 10-15 minutes after feeding, before laying him flat on his back.    A typical feeding is 10-15 minutes on each side; total of 20-30 minutes per breast-feeding session.     Supplementation: A typical feeding volume is about 2-3 oz per feeding. Mirza was able to transfer 1.7 oz plus his recent feeding from 2 hours ago. Given his good weight gain, supplement only when needed.    Pumping plan: Pump as needed for relief after feedings. May pump for 10 minutes.  Pumping before feedings due to engorgement can also help with overactive letdown.  You can try pumping for 1 to 2 minutes to help with this.  The best time to pump for milk storage is early morning or late evening.    Continue to monitor output, expect at least 6 wet diapers per day and 2-4 stools in a day. If Mirza Dye has less than the recommended wet diapers, please call us.     Mirza Dye should start Vitamin D 400 international units, once daily.    Follow up with your primary care provider in 2 weeks for 1 month wellness visit.  Follow up with lactation as needed.    Maternal nipple care:   Best way to help decrease nipple soreness is to obtain a deep latch. When you pump, nipples should not touch the sides of the flange. Apply lanolin or coconut oil after breast-feeding or pumping. Wipe away left over residue before next breast-feeding or pumping. Allow nipples to air dry as much as possible to help stimulate healing. If mother is experiencing persistent breast pain, flu-like symptoms, localized breast tenderness/redness/warmness, or fevers, please contact mother's primary care provider or Obstetrician/midwife for further evaluation.    Return to clinic sooner or call clinic  sooner for any worsening symptoms (inconsolability, fever greater than 100.4F, less wet diapers, no stools, sleepiness, difficulty feeding, abdominal distention).    For further lactation concerns, please call [phone].       ____________________________________________________________________  SUBJECTIVE:     Mirza Dye is a 2 week old old male infant born at Gestational Age: 38w6d via , Low Transverse delivery on 2022 at 11:12 AM here for lactation support. This patient is accompanied in the office by his mother and father.   Concerns: difficulty latching on the left side. Mom has good milk supply and let down which makes it difficult to sustain a latch.    Baby is nursing every 3 hours for about 10-20 minutes per session. Typically only nurses on one side per feeding. Offers the other side, but he is content.  Mother reports hearing audible swallows.   Baby feeds about 9 times in 24 hours and needs to be awaken before feeds.  Baby is not supplementing after feeds.  Baby has about 9 wet diapers and 6-8 stools per day. Stools are yellow in color.    Mom is also pumping before feeds to help with over-active letdown. Clare once a day Mom noticed her breasts grew larger and areolas darkened during pregnancy and she noticed primary engorgement when her milk came in on day 5.    Breastfeeding Goals: continue breast-feeding up until 1 year. Getting enough milk.     Previous Breastfeeding Experience: first baby.    Breast-surgery: none    Maternal medications: levothyroxine, prenatal vitamin  Infant medications: none.        Patient Active Problem List    Diagnosis Date Noted     Normal  (single liveborn) 2022     Priority: Medium     No results found for any visits on 22.  No current outpatient medications on file.  History reviewed. No pertinent past medical history.  History reviewed. No pertinent surgical history.  History reviewed. No pertinent family  history.    Primary care provider: [facility]    OBJECTIVE:    Mother:   Nipples are everted, the areola is compressible, the breast is soft and full.     Sore nipples: some redness. No cracking   Maternal pain: none    Maternal depression screening: Doing well    Infant:   Vitals:    09/01/22 1135   Temp: 98.3  F (36.8  C)   TempSrc: Axillary   Weight: 7 lb 9.6 oz (3.447 kg)       Average weight gain over last 6 days: 8.4 oz     Weight:   Birthweight: 7 lbs .88 oz  Clinic weight on 8/26: 7 lbs 1.2 oz  Today's weight: 7 lbs 9.6 oz    8% from birth weight.    Amount of milk transferred from LEFT side: 1.3 oz  Amount of milk transferred from RIGHT side: 0.4 oz    Total transfer: 1.7 oz    Feeding assessment:     Infant can draw gloved finger into mouth. Infant demonstrated a coordinated suck. Infant palate is intact, tongue over gums, normal frenulum.   Infant can hold suction with tongue while at the breast. Tends to demonstrate a shallow latch. This improved with downward pressure on his chin to obtain a wider mouth gape. Infant needed frequent stimulation at the breast after 5-6 minutes of nursing.    Alignment: Infant's head was aligned with its trunk. Infant did face mother. Baby was in cradle position today. Discussed importance of infant ventral positioning to obtain a deeper latch.     Areolar Grasp:  Infant was assisted by gently applying downward pressure to the chin to open mouth wide. Infant's lips were not pursed. Infant's lips did flange outward. Tongue was visible just barely over bottom lip. Infant had complete seal.     Areolar Compression: Infant made rhythmic motion. There were no clicking or smacking sounds. There was no severe nipple discomfort.  Nipples appeared round after feeding.    Audible swallowing: Infant made quiet sounds of swallowing. There was an increase in frequency after milk ejection reflex.     PHYSICAL EXAM    Gen: Alert, no acute distress.   Head:  "Anterior and posterior fontanelles are flat and soft.   Eyes: No eye drainage. Sclera clear.   Ears: Pinna appear well-formed. No pits.   Nose: nares patent. No nasal congestion. No nasal flaring.  Mouth: Oral mucosa moist. Tongue midline (tongue elevation adequate when crying, good lateralization). Frenulum palpable. No \"heart-shaped\" tongue. Tongue able to extend pass lower gumline. Lips closed at rest.   Neck: Clavicles intact bilaterally.  Lungs: Clear to auscultation bilaterally.   Cardiac: Regular regular rate and rhythm, S1S2, no murmurs. Brachial and femoral pulses +2 and equal.  Abdomen: Soft, nontender, bowel sounds present, no hepatosplenomegaly or mass palpable. Umbilicus dry with no erythema or drainage.   : Felipe stage 1 male genitalia  Skin: Intact. Dry. No rash. No jaundice.   Musculoskeletal: equal movements of upper and lower extremities.   Neuro: Appropriate muscle tone.    The visit lasted a total of 43 minutes that I spent face to face with the patient. Of that time, 43 minutes were spent on lactation. Over 50% of the time was spent counseling and educating the patient about normal  care and growth.    Completed by:   Candelaria Saenz, JESUS, CPNP, IBCLC  [facility]  [facility]  2022, 11:46 AM        "

## 2022-09-11 ENCOUNTER — HEALTH MAINTENANCE LETTER (OUTPATIENT)
Age: 32
End: 2022-09-11

## 2022-09-27 ENCOUNTER — PRENATAL OFFICE VISIT (OUTPATIENT)
Dept: OBGYN | Facility: CLINIC | Age: 32
End: 2022-09-27
Payer: COMMERCIAL

## 2022-09-27 VITALS
HEIGHT: 67 IN | SYSTOLIC BLOOD PRESSURE: 119 MMHG | HEART RATE: 86 BPM | OXYGEN SATURATION: 97 % | BODY MASS INDEX: 25.27 KG/M2 | DIASTOLIC BLOOD PRESSURE: 75 MMHG | WEIGHT: 161 LBS

## 2022-09-27 DIAGNOSIS — E03.9 HYPOTHYROIDISM, UNSPECIFIED TYPE: ICD-10-CM

## 2022-09-27 PROCEDURE — 36415 COLL VENOUS BLD VENIPUNCTURE: CPT | Performed by: NURSE PRACTITIONER

## 2022-09-27 PROCEDURE — 84439 ASSAY OF FREE THYROXINE: CPT | Performed by: NURSE PRACTITIONER

## 2022-09-27 PROCEDURE — 99207 PR POST PARTUM EXAM: CPT | Performed by: NURSE PRACTITIONER

## 2022-09-27 PROCEDURE — 84443 ASSAY THYROID STIM HORMONE: CPT | Performed by: NURSE PRACTITIONER

## 2022-09-27 ASSESSMENT — PATIENT HEALTH QUESTIONNAIRE - PHQ9
SUM OF ALL RESPONSES TO PHQ QUESTIONS 1-9: 5
SUM OF ALL RESPONSES TO PHQ QUESTIONS 1-9: 5
10. IF YOU CHECKED OFF ANY PROBLEMS, HOW DIFFICULT HAVE THESE PROBLEMS MADE IT FOR YOU TO DO YOUR WORK, TAKE CARE OF THINGS AT HOME, OR GET ALONG WITH OTHER PEOPLE: NOT DIFFICULT AT ALL

## 2022-09-27 ASSESSMENT — PAIN SCALES - GENERAL: PAINLEVEL: NO PAIN (0)

## 2022-09-27 NOTE — PROGRESS NOTES
Lizabeth is here for a postpartum checkup.    She had a primary  section  OB History    Para Term  AB Living   1 1 1 0 0 1   SAB IAB Ectopic Multiple Live Births   0 0 0 0 1      # Outcome Date GA Lbr Earle/2nd Weight Sex Delivery Anes PTL Lv   1 Term / 38w6d / 03:23 3.2 kg (7 lb 0.9 oz) M CS-LTranv EPI, Spinal N ANISHA      Complications: Failure to Progress in Second Stage      Name: SONAM ESTES      Apgar1: 9  Apgar5: 9      Since delivery, she has been breast feeding.  She has not had a normal menses.  She has not had intercourse.  Patient screened for postpartum depression and her PHQ-9 is reviewed. Screening has also been completed for intimate partner violence. She would like to discuss contraception.    O: This is a well appearing female in no acute distress. Answers questions and maintains eye contact appropriately. Vital signs noted.  RESPIRATORY: Clear to auscultation bilaterally.  CV: Regular rate and rhythm without murmur, gallop, rub  ABDOMEN: Soft, nontender, nondistended.  Incision: intact, no erythema, induration or discharge  Vulva: No external lesions, normal hair distribution, no adenopathy  BUS:  Normal, no masses noted  Vagina: Moist, pink, no abnormal discharge, well rugated, no lesions  Cervix: Pink, nulliparous, midline. Without cervical motion tenderness.  Uterus: Normal size and shape, non-tender, mobile  Ovaries: No masses, non-tender, mobile    A/P:  (Z39.2) Routine postpartum follow-up  (primary encounter diagnosis)  Comment: We did review contraception options that are compatible with nursing, hesitant due to their infertility journey. Planning condoms for now, may consider copper IUD.    (E03.9) Hypothyroidism, unspecified type  Comment: Adjust dose if indicated.  Plan: TSH with free T4 reflex        Vanessa LEE CNP

## 2022-09-27 NOTE — PATIENT INSTRUCTIONS
If you have any questions regarding your visit, Please contact your care team.     [facility]: [phone]  To Schedule an Appointment 24/7  Call: [phone] Health HOURS TELEPHONE NUMBER     Vanessa Zavala- APRN CNP      Esvin Kay-Surgery Scheduler  [facility]-Surgery Scheduler         Monday 7:30 am-5:00 pm    Tuesday 8:00 am-4:00 pm    Wednesday 7:30 am-4:00 pm  [place]    Thursday 8:00 am-11:00 am    Friday 7:30 am-4:00 pm [address] [place] [address] [phone] ask for [facility]  [phone] Fax    Imaging Scheduling all locations  [phone]     [facility]  [address] [place] [address]   [place], [place] [id number] [phone]         Urgent Care locations:  [facility]   Monday-Friday  10 am - 8 pm  Saturday and Sunday   9 am - 5 pm     [phone]   If you need a medication refill, please contact your pharmacy. Please allow 3 business days for your refill to be completed.  As always, Thank you for trusting us with your healthcare needs!      see additional instructions from your care team below

## 2022-09-28 LAB
T4 FREE SERPL-MCNC: 1.17 NG/DL (ref 0.76–1.46)
TSH SERPL DL<=0.005 MIU/L-ACNC: 0.09 MU/L (ref 0.4–4)

## 2022-09-28 RX ORDER — LEVOTHYROXINE SODIUM 100 UG/1
100 TABLET ORAL DAILY
Qty: 30 TABLET | Refills: 2 | Status: SHIPPED | OUTPATIENT
Start: 2022-09-28 | End: 2022-11-15 | Stop reason: DRUGHIGH

## 2022-10-18 ENCOUNTER — MEDICAL CORRESPONDENCE (OUTPATIENT)
Dept: HEALTH INFORMATION MANAGEMENT | Facility: CLINIC | Age: 32
End: 2022-10-18

## 2022-11-04 ENCOUNTER — NURSE TRIAGE (OUTPATIENT)
Dept: FAMILY MEDICINE | Facility: CLINIC | Age: 32
End: 2022-11-04

## 2022-11-04 NOTE — TELEPHONE ENCOUNTER
"Pt calling stating she has a runny nose and sore throat and inquires which OTC medications she can take to treat sx while breastfeeding. RN reviewed triage advice per protocol with pt regarding what she can and cannot take.    Reason for Disposition    Maternal OTC or prescription medications, questions about    Additional Information    Negative: Has breastfeeding concerns about the baby and no recent maternal drug or alcohol use    Negative: Breastfeeding and maternal breast symptoms or maternal illness    Negative: Wants help for maternal substance abuse problem and no symptoms in  baby (Caution: stop breastfeeding)    Negative: Irritability or sedation in  baby and recent substance abuse, marijuana use or narcotic use by mother    Negative: Irritability or sedation in  baby and excessive recent alcohol ingestion by mother    Negative: Caller has urgent question about prescribed med use and breastfeeding and triager unable to answer question    Negative: Caller has non-urgent question about prescribed med use and breastfeeding and triager unable to answer question    Answer Assessment - Initial Assessment Questions  1. MAIN QUESTION:  \"What is your main question about you and breastfeeding?\"      Mom has runny nose and sore throat    2. SYMPTOMS: \"Does your baby have any symptoms?\"      Sneezing more today and coughing a little bit    3. BABY'S FEEDING: \"How is your baby feeding?\"       Breast fed exclusively    4. BABY'S APPEARANCE: \"How is your baby acting?\"      Acting normal    Protocols used: BREASTFEEDING - MOTHER'S MEDICINES AND DIET-P-OH    ELLEN De Luna, RN    "

## 2022-11-12 ENCOUNTER — MYC MEDICAL ADVICE (OUTPATIENT)
Dept: OBGYN | Facility: CLINIC | Age: 32
End: 2022-11-12

## 2022-11-14 ENCOUNTER — LAB (OUTPATIENT)
Dept: LAB | Facility: CLINIC | Age: 32
End: 2022-11-14
Payer: COMMERCIAL

## 2022-11-14 ENCOUNTER — OFFICE VISIT (OUTPATIENT)
Dept: URGENT CARE | Facility: URGENT CARE | Age: 32
End: 2022-11-14
Payer: COMMERCIAL

## 2022-11-14 VITALS
DIASTOLIC BLOOD PRESSURE: 68 MMHG | OXYGEN SATURATION: 98 % | TEMPERATURE: 97.9 F | SYSTOLIC BLOOD PRESSURE: 107 MMHG | RESPIRATION RATE: 18 BRPM | HEART RATE: 86 BPM

## 2022-11-14 DIAGNOSIS — B02.9 HERPES ZOSTER WITHOUT COMPLICATION: Primary | ICD-10-CM

## 2022-11-14 DIAGNOSIS — E03.9 HYPOTHYROIDISM, UNSPECIFIED TYPE: ICD-10-CM

## 2022-11-14 LAB
T4 FREE SERPL-MCNC: 1.21 NG/DL (ref 0.76–1.46)
TSH SERPL DL<=0.005 MIU/L-ACNC: 0.04 MU/L (ref 0.4–4)

## 2022-11-14 PROCEDURE — 36415 COLL VENOUS BLD VENIPUNCTURE: CPT

## 2022-11-14 PROCEDURE — 99213 OFFICE O/P EST LOW 20 MIN: CPT | Performed by: NURSE PRACTITIONER

## 2022-11-14 PROCEDURE — 84443 ASSAY THYROID STIM HORMONE: CPT

## 2022-11-14 PROCEDURE — 84439 ASSAY OF FREE THYROXINE: CPT

## 2022-11-14 RX ORDER — VALACYCLOVIR HYDROCHLORIDE 1 G/1
1000 TABLET, FILM COATED ORAL 3 TIMES DAILY
Qty: 21 TABLET | Refills: 0 | Status: SHIPPED | OUTPATIENT
Start: 2022-11-14 | End: 2022-11-21

## 2022-11-14 NOTE — PROGRESS NOTES
SUBJECTIVE:  Lizabeth Dye is a 32 year old female who presents to the clinic today for a rash.  Onset of rash was 2 days ago  Rash is worsening/spreading. There was more pain than itching  Location: right upper shoulder near breast  Previous history of a similar rash? No  Recent exposure history: none known    Associated symptoms include: nothing.    Past Medical History:   Diagnosis Date     Hx of abnormal cervical Pap smear     ASCUS in 2013; nl pap in 4/2015- See care everywhere for details.     Migraine      Other specified hypothyroidism      Current Outpatient Medications   Medication Sig Dispense Refill     levothyroxine (SYNTHROID/LEVOTHROID) 100 MCG tablet Take 1 tablet (100 mcg) by mouth daily 30 tablet 2     Prenatal Vit-Fe Fumarate-FA (PRENATAL MULTIVITAMIN W/IRON) 27-0.8 MG tablet Take 1 tablet by mouth daily       Social History     Tobacco Use     Smoking status: Never     Smokeless tobacco: Never   Substance Use Topics     Alcohol use: Not Currently       ROS:  Review of systems negative except as stated above.    EXAM:   /68   Pulse 86   Temp 97.9  F (36.6  C) (Tympanic)   Resp 18   SpO2 98%   GENERAL: alert, no acute distress.  SKIN: Rash description:  Vesicular erythematous rash on right upper shoulder into breast following one dermatome  EYES: EOMI,  PERRL, conjunctiva clear  NECK: supple, non-tender to palpation, no adenopathy noted  RESP: lungs clear to auscultation - no rales, rhonchi or wheezes  CV: regular rates and rhythm, normal S1 S2, no murmur noted    ASSESSMENT:  (B02.9) Herpes zoster without complication  (primary encounter diagnosis)    Plan: valACYclovir (VALTREX) 1000 mg tablet as directed  Safe to use while breastfeeding  Discussed with OB, she will avoid nursing on right side X 1 week (will pump) concern about contact  Home treat and monitor symptoms   Follow-up with primary clinic if not improving    JESUS Rodriguez CNP

## 2022-11-15 DIAGNOSIS — E03.9 HYPOTHYROIDISM, UNSPECIFIED TYPE: Primary | ICD-10-CM

## 2022-11-15 RX ORDER — LEVOTHYROXINE SODIUM 88 UG/1
88 TABLET ORAL DAILY
Qty: 30 TABLET | Refills: 2 | Status: SHIPPED | OUTPATIENT
Start: 2022-11-15 | End: 2023-01-16

## 2022-12-10 ENCOUNTER — NURSE TRIAGE (OUTPATIENT)
Dept: NURSING | Facility: CLINIC | Age: 32
End: 2022-12-10

## 2022-12-10 NOTE — TELEPHONE ENCOUNTER
"\"I haven't been feeling good all day. I took PeptoBismol and found out I wasn't supposed to because I am breastfeeding. How long do I need to express and dump my breast milk ?\"  Advised to discuss with pharmacist @ Poison Control. Phone call was transferred to Poison Control per patient request.    Suzie Azevedo RN Triage Nurse Advisor 5:59 PM 12/10/2022     Reason for Disposition    [1] Caller has medicine question about med NOT prescribed by PCP AND [2] triager unable to answer question (e.g., compatibility with other med, storage)    Additional Information    Negative: [1] Intentional drug overdose AND [2] suicidal thoughts or ideas    Negative: Drug overdose and triager unable to answer question    Negative: Caller requesting a renewal or refill of a medicine patient is currently taking    Negative: Caller requesting information unrelated to medicine    Negative: Caller requesting information about COVID-19 Vaccine    Negative: Caller requesting information about Emergency Contraception    Negative: Caller requesting information about Combined Birth Control Pills    Negative: Caller requesting information about Progestin Birth Control Pills    Negative: Caller requesting information about Post-Op pain or medicines    Negative: Caller requesting a prescription antibiotic (such as Penicillin) for Strep throat and has a positive culture result    Negative: Caller requesting a prescription anti-viral med (such as Tamiflu) and has influenza (flu) symptoms    Negative: Immunization reaction suspected    Negative: Rash while taking a medicine or within 3 days of stopping it    Negative: [1] Asthma and [2] having symptoms of asthma (cough, wheezing, etc.)    Negative: [1] Symptom of illness (e.g., headache, abdominal pain, earache, vomiting) AND [2] more than mild    Negative: Breastfeeding questions about mother's medicines and diet    Negative: MORE THAN A DOUBLE DOSE of a prescription or over-the-counter (OTC) " drug    Negative: [1] DOUBLE DOSE (an extra dose or lesser amount) of prescription drug AND [2] any symptoms (e.g., dizziness, nausea, pain, sleepiness)    Negative: [1] DOUBLE DOSE (an extra dose or lesser amount) of over-the-counter (OTC) drug AND [2] any symptoms (e.g., dizziness, nausea, pain, sleepiness)    Negative: Took another person's prescription drug    Negative: [1] DOUBLE DOSE (an extra dose or lesser amount) of prescription drug AND [2] NO symptoms (Exception: a double dose of antibiotics)    Negative: Diabetes drug error or overdose (e.g., took wrong type of insulin or took extra dose)    Negative: [1] Prescription not at pharmacy AND [2] was prescribed by PCP recently (Exception: triager has access to EMR and prescription is recorded there. Go to Home Care and confirm for pharmacy.)    Negative: [1] Pharmacy calling with prescription question AND [2] triager unable to answer question    Negative: [1] Caller has URGENT medicine question about med that PCP or specialist prescribed AND [2] triager unable to answer question    Negative: Medicine patch causing local rash or itching    Protocols used: MEDICATION QUESTION CALL-A-

## 2022-12-11 NOTE — TELEPHONE ENCOUNTER
Lizabeth has new onset of Severe Diarrhea - 10 episodes in the past 3 hours    She is currently breastfeeding  She took Pepto Bismol earlier. She has spoken to a Pharmacist and will be dumping her milk for a couple of days.    ** See previous Nurse Triage encounter on 12/10/22 with DINO Azevedo RN re: Medication question    Home Care advised  Care Advice reviewed    Imodium-right ear (Loperamide) suggested.  Per [employer]:   Use of loperamide during breastfeeding is unlikely to affect the infant.    Jeanie Kerr RN  [facility] Nurse Advisors      Reason for Disposition    SEVERE diarrhea (e.g., 7 or more times / day more than normal)    Additional Information    Negative: Difficult to awaken or acting confused (e.g., disoriented, slurred speech)    Negative: Sounds like a life-threatening emergency to the triager    Negative: Shock suspected (e.g., cold/pale/clammy skin, too weak to stand, low BP, rapid pulse)    Negative: [1] SEVERE abdominal pain (e.g., excruciating) AND [2] present > 1 hour    Negative: [1] SEVERE abdominal pain AND [2] age > 60 years    Negative: [1] Blood in the stool AND [2] moderate or large amount of blood    Negative: Black or tarry bowel movements (Exception: chronic-unchanged black-grey bowel movements AND is taking iron pills or Pepto-bismol)    Negative: [1] Drinking very little AND [2] dehydration suspected (e.g., no urine > 12 hours, very dry mouth, very lightheaded)    Negative: Patient sounds very sick or weak to the triager    Negative: [1] SEVERE diarrhea (e.g., 7 or more times / day more than normal) AND [2] age > 60 years    Negative: [1] Constant abdominal pain AND [2] present > 2 hours    Negative: [1] Fever > 103 F (39.4 C) AND [2] not able to get the fever down using Fever Care Advice    Negative: [1] SEVERE diarrhea (e.g., 7 or more times / day more than normal) AND [2] present > 24 hours (1 day)    Negative: [1] MODERATE diarrhea (e.g., 4-6 times / day more than  normal) AND [2] present > 48 hours (2 days)    Negative: [1] MODERATE diarrhea (e.g., 4-6 times / day more than normal) AND [2] age > 70 years    Negative: Fever > 101 F (38.3 C)    Negative: Fever present > 3 days (72 hours)    Negative: Abdominal pain  (Exception: Pain clears with each passage of diarrhea stool)    Negative: [1] Blood in the stool AND [2] small amount of blood   (Exception: only on toilet paper. Reason: diarrhea can cause rectal irritation with blood on wiping)    Negative: [1] Mucus or pus in stool AND [2] present > 2 days AND [3] diarrhea is more than mild    Negative: [1] Recent antibiotic therapy (i.e., within last 2 months) AND [2] diarrhea present > 3 days since antibiotic was stopped    Negative: [1] Recent hospitalization AND [2] diarrhea present > 3 days    Negative: Weak immune system (e.g., HIV positive, cancer chemo, splenectomy, organ transplant, chronic steroids)    Negative: Tube feedings (e.g., nasogastric, g-tube, j-tube)    Negative: Travel to a foreign country in past month    Negative: [1] MILD diarrhea (e.g., 1-3 or more stools than normal in past 24 hours) without known cause AND [2] present >  7 days    Negative: Diarrhea is a chronic symptom (recurrent or ongoing AND present > 4 weeks)    Protocols used: DIARRHEA-A-

## 2022-12-15 ENCOUNTER — OFFICE VISIT (OUTPATIENT)
Dept: FAMILY MEDICINE | Facility: CLINIC | Age: 32
End: 2022-12-15
Payer: COMMERCIAL

## 2022-12-15 ENCOUNTER — MEDICAL CORRESPONDENCE (OUTPATIENT)
Dept: HEALTH INFORMATION MANAGEMENT | Facility: CLINIC | Age: 32
End: 2022-12-15

## 2022-12-15 VITALS
SYSTOLIC BLOOD PRESSURE: 105 MMHG | WEIGHT: 160 LBS | RESPIRATION RATE: 16 BRPM | BODY MASS INDEX: 25.11 KG/M2 | OXYGEN SATURATION: 96 % | TEMPERATURE: 97 F | HEART RATE: 80 BPM | DIASTOLIC BLOOD PRESSURE: 70 MMHG | HEIGHT: 67 IN

## 2022-12-15 DIAGNOSIS — Z00.00 ROUTINE GENERAL MEDICAL EXAMINATION AT A HEALTH CARE FACILITY: Primary | ICD-10-CM

## 2022-12-15 DIAGNOSIS — E03.9 HYPOTHYROIDISM, UNSPECIFIED TYPE: ICD-10-CM

## 2022-12-15 PROCEDURE — 99395 PREV VISIT EST AGE 18-39: CPT | Performed by: PHYSICIAN ASSISTANT

## 2022-12-15 ASSESSMENT — ENCOUNTER SYMPTOMS
DIARRHEA: 0
SORE THROAT: 0
HEMATURIA: 0
HEARTBURN: 0
NERVOUS/ANXIOUS: 1
CONSTIPATION: 0
ARTHRALGIAS: 0
FEVER: 0
COUGH: 0
PALPITATIONS: 0
SHORTNESS OF BREATH: 0
NAUSEA: 0
ABDOMINAL PAIN: 0
HEADACHES: 0
FREQUENCY: 0
CHILLS: 0
EYE PAIN: 0
DIZZINESS: 0
HEMATOCHEZIA: 0
BREAST MASS: 0
PARESTHESIAS: 0
MYALGIAS: 0
WEAKNESS: 0
JOINT SWELLING: 0
DYSURIA: 0

## 2022-12-15 ASSESSMENT — PAIN SCALES - GENERAL: PAINLEVEL: NO PAIN (0)

## 2022-12-15 NOTE — PROGRESS NOTES
SUBJECTIVE:   CC: CASSIE is an 32 year old who presents for preventive health visit.   Patient has been advised of split billing requirements and indicates understanding: Yes  Healthy Habits:     Getting at least 3 servings of Calcium per day:  Yes    Bi-annual eye exam:  Yes    Dental care twice a year:  Yes    Sleep apnea or symptoms of sleep apnea:  None    Diet:  Regular (no restrictions)    Frequency of exercise:  4-5 days/week    Duration of exercise:  15-30 minutes    Taking medications regularly:  Yes    Medication side effects:  None    PHQ-2 Total Score: 0    Additional concerns today:  Yes      Hypothyroid: she is due for testing again in 1 month. She had her dose adjusted at her last appointment with GYN.         Today's PHQ-2 Score:   PHQ-2 ( 1999 Pfizer) 12/15/2022   Q1: Little interest or pleasure in doing things 0   Q2: Feeling down, depressed or hopeless 0   PHQ-2 Score 0   PHQ-2 Total Score (12-17 Years)- Positive if 3 or more points; Administer PHQ-A if positive -   Q1: Little interest or pleasure in doing things Not at all   Q2: Feeling down, depressed or hopeless Not at all   PHQ-2 Score 0       Have you ever done Advance Care Planning? (For example, a Health Directive, POLST, or a discussion with a medical provider or your loved ones about your wishes): Yes, patient states has an Advance Care Planning document and will bring a copy to the clinic.    Social History     Tobacco Use     Smoking status: Never     Smokeless tobacco: Never   Substance Use Topics     Alcohol use: Not Currently         Alcohol Use 12/15/2022   Prescreen: >3 drinks/day or >7 drinks/week? No   Prescreen: >3 drinks/day or >7 drinks/week? -       Reviewed orders with patient.  Reviewed health maintenance and updated orders accordingly - Yes  BP Readings from Last 3 Encounters:   12/15/22 105/70   11/14/22 107/68   09/27/22 119/75    Wt Readings from Last 3 Encounters:   12/15/22 72.6 kg (160 lb)   09/27/22 73 kg (161 lb)    22 74 kg (163 lb 3 oz)                  Patient Active Problem List   Diagnosis     Chronic allergic rhinitis, unspecified seasonality, unspecified trigger     Hypothyroidism, unspecified type     Need for Tdap vaccination     Supervision of pregnancy with history of infertility     Rh negative state in antepartum period     PROM (premature rupture of membranes)     Past Surgical History:   Procedure Laterality Date      SECTION N/A 2022    Procedure:  SECTION;  Surgeon: Tete Jason MD;  Location: [facility]     NO HISTORY OF SURGERY         Social History     Tobacco Use     Smoking status: Never     Smokeless tobacco: Never   Substance Use Topics     Alcohol use: Not Currently     Family History   Problem Relation Age of Onset     Depression Mother      Breast Cancer Mother      Thyroid Disease Mother      Skin Cancer Mother      Diabetes Father      Thyroid Disease Father      Kidney Disease Father      Skin Cancer Father      Heart Disease Father      No Known Problems Brother      Leukemia Maternal Grandmother      Cerebrovascular Disease Maternal Grandfather      Emphysema Paternal Grandmother      Kidney failure Paternal Grandfather      Glaucoma No family hx of      Macular Degeneration No family hx of      Colon Cancer No family hx of          Current Outpatient Medications   Medication Sig Dispense Refill     levothyroxine (SYNTHROID/LEVOTHROID) 88 MCG tablet Take 1 tablet (88 mcg) by mouth daily 30 tablet 2     Prenatal Vit-Fe Fumarate-FA (PRENATAL MULTIVITAMIN W/IRON) 27-0.8 MG tablet Take 1 tablet by mouth daily       valACYclovir (VALTREX) 1000 mg tablet Take 1 tablet (1,000 mg) by mouth 3 times daily for 7 days 21 tablet 0     Allergies   Allergen Reactions     No Clinical Screening - See Comments Itching     Cats,dogs     Recent Labs   Lab Test 22  0930 22  1230 22  0524 22  0711   ALT  --   --  18 20   CR  --   --  0.56  --    GFRESTIMATED  --    --  >90  --    TSH 0.04* 0.09*  --   --         Breast Cancer Screening:    FHS-7:   Breast CA Risk Assessment (FHS-7) 12/15/2022   Did any of your first-degree relatives have breast or ovarian cancer? Yes   Did any of your relatives have bilateral breast cancer? No   Did any man in your family have breast cancer? No   Did any woman in your family have breast and ovarian cancer? Yes   Did any woman in your family have breast cancer before age 50 y? No   Do you have 2 or more relatives with breast and/or ovarian cancer? No   Do you have 2 or more relatives with breast and/or bowel cancer? No       Patient under 40 years of age: Routine Mammogram Screening not recommended.   Pertinent mammograms are reviewed under the imaging tab.    History of abnormal Pap smear:   NO - age 30-65 PAP every 5 years with negative HPV co-testing recommended  Last 3 Pap and HPV Results:   PAP / HPV Latest Ref Rng & Units 2022   PAP   Negative for Intraepithelial Lesion or Malignancy (NILM) -   PAP (Historical) - - NIL   HPV16 Negative Negative -   HPV18 Negative Negative -   HRHPV Negative Negative -     PAP / HPV Latest Ref Rng & Units 2022   PAP   Negative for Intraepithelial Lesion or Malignancy (NILM) -   PAP (Historical) - - NIL   HPV16 Negative Negative -   HPV18 Negative Negative -   HRHPV Negative Negative -     Reviewed and updated as needed this visit by clinical staff   Tobacco  Allergies  Meds  Problems  Med Hx  Surg Hx  Fam Hx          Reviewed and updated as needed this visit by Provider   Tobacco  Allergies  Meds  Problems  Med Hx  Surg Hx  Fam Hx         Past Medical History:   Diagnosis Date     Hx of abnormal cervical Pap smear     ASCUS in ; nl pap in 2015- See care everywhere for details.     Migraine      Other specified hypothyroidism       Past Surgical History:   Procedure Laterality Date      SECTION N/A 2022    Procedure:  SECTION;  Surgeon:  "Tete Jason MD;  [facility]     NO HISTORY OF SURGERY       OB History    Para Term  AB Living   1 1 1 0 0 1   SAB IAB Ectopic Multiple Live Births   0 0 0 0 1      # Outcome Date GA Lbr Earle/2nd Weight Sex Delivery Anes PTL Lv   1 Term 22 38w6d / 03:23 3.2 kg (7 lb 0.9 oz) M CS-LTranv EPI, Spinal N ANISHA      Complications: Failure to Progress in Second Stage      Name: SONAM ESTES      Apgar1: 9  Apgar5: 9       Review of Systems   Constitutional: Negative for chills and fever.   HENT: Positive for congestion. Negative for ear pain, hearing loss and sore throat.    Eyes: Negative for pain and visual disturbance.   Respiratory: Negative for cough and shortness of breath.    Cardiovascular: Negative for chest pain, palpitations and peripheral edema.   Gastrointestinal: Negative for abdominal pain, constipation, diarrhea, heartburn, hematochezia and nausea.   Breasts:  Positive for tenderness and discharge. Negative for breast mass.   Genitourinary: Positive for vaginal discharge. Negative for dysuria, frequency, genital sores, hematuria, pelvic pain, urgency and vaginal bleeding.   Musculoskeletal: Negative for arthralgias, joint swelling and myalgias.   Skin: Negative for rash.   Neurological: Negative for dizziness, weakness, headaches and paresthesias.   Psychiatric/Behavioral: Negative for mood changes. The patient is nervous/anxious.           OBJECTIVE:   /70   Pulse 80   Temp 97  F (36.1  C) (Tympanic)   Resp 16   Ht 1.689 m (5' 6.5\")   Wt 72.6 kg (160 lb)   SpO2 96%   BMI 25.44 kg/m    Physical Exam  GENERAL: healthy, alert and no distress  EYES: Eyes grossly normal to inspection, PERRL and conjunctivae and sclerae normal  HENT: ear canals and TM's normal, nose and mouth without ulcers or lesions  NECK: no adenopathy, no asymmetry, masses, or scars and thyroid normal to palpation  RESP: lungs clear to auscultation - no rales, rhonchi or wheezes  BREAST: normal " "without masses, tenderness or nipple discharge and no palpable axillary masses or adenopathy  CV: regular rate and rhythm, normal S1 S2, no S3 or S4, no murmur, click or rub, no peripheral edema and peripheral pulses strong  ABDOMEN: soft, nontender, no hepatosplenomegaly, no masses and bowel sounds normal  MS: no gross musculoskeletal defects noted, no edema  SKIN: no suspicious lesions or rashes  NEURO: Normal strength and tone, mentation intact and speech normal  PSYCH: mentation appears normal, affect normal/bright    Diagnostic Test Results:  Labs reviewed in Epic    ASSESSMENT/PLAN:   (Z00.00) Routine general medical examination at a health care facility  (primary encounter diagnosis)  Comment: Health maintenance reviewed and updated.    (E03.9) Hypothyroidism, unspecified type  Comment: she will be due to check TSH in 1 month after GYN made adjustment with her thyroid dose. I have ordered the test and she will make a lab only appointment.   Plan: TSH with free T4 reflex              Patient has been advised of split billing requirements and indicates understanding: Yes      COUNSELING:  Reviewed preventive health counseling, as reflected in patient instructions       Regular exercise       Healthy diet/nutrition      BMI:   Estimated body mass index is 25.44 kg/m  as calculated from the following:    Height as of this encounter: 1.689 m (5' 6.5\").    Weight as of this encounter: 72.6 kg (160 lb).         She reports that she has never smoked. She has never used smokeless tobacco.      Kristen M. Kehr, PA-C [facility]  "

## 2022-12-15 NOTE — PROGRESS NOTES
SUBJECTIVE:   CC: CASSIE is an 32 year old who presents for preventive health visit.   {Split Bill scripting  The purpose of this visit is to discuss your medical history and prevent health problems before you are sick. You may be responsible for a co-pay, coinsurance, or deductible if your visit today includes services such as checking on a sore throat, having an x-ray or lab test, or treating and evaluating a new or existing condition :836800}  Patient has been advised of split billing requirements and indicates understanding: Yes  Healthy Habits:     Getting at least 3 servings of Calcium per day:  Yes    Bi-annual eye exam:  Yes    Dental care twice a year:  Yes    Sleep apnea or symptoms of sleep apnea:  None    Diet:  Regular (no restrictions)    Frequency of exercise:  4-5 days/week    Duration of exercise:  15-30 minutes    Taking medications regularly:  Yes    Medication side effects:  None    PHQ-2 Total Score: 0    Additional concerns today:  Yes    {Add if <65 person on Medicare  - Required Questions (Optional):066785}  {Outside tests to abstract? :874498}    {additional problems to add (Optional):434926}    Today's PHQ-2 Score:   PHQ-2 ( 1999 Pfizer) 12/15/2022   Q1: Little interest or pleasure in doing things 0   Q2: Feeling down, depressed or hopeless 0   PHQ-2 Score 0   PHQ-2 Total Score (12-17 Years)- Positive if 3 or more points; Administer PHQ-A if positive -   Q1: Little interest or pleasure in doing things Not at all   Q2: Feeling down, depressed or hopeless Not at all   PHQ-2 Score 0       Have you ever done Advance Care Planning? (For example, a Health Directive, POLST, or a discussion with a medical provider or your loved ones about your wishes): No, advance care planning information given to patient to review.  Patient declined advance care planning discussion at this time.    Social History     Tobacco Use     Smoking status: Never     Smokeless tobacco: Never   Substance Use Topics      "Alcohol use: Not Currently         Alcohol Use 12/15/2022   Prescreen: >3 drinks/day or >7 drinks/week? No   Prescreen: >3 drinks/day or >7 drinks/week? -       Reviewed orders with patient.  Reviewed health maintenance and updated orders accordingly - { :407127::\"Yes\"}  {Chronicprobdata (optional):881151}    Breast Cancer Screening:    FHS-7:   Breast CA Risk Assessment (FHS-7) 12/15/2022   Did any of your first-degree relatives have breast or ovarian cancer? Yes   Did any of your relatives have bilateral breast cancer? No   Did any man in your family have breast cancer? No   Did any woman in your family have breast and ovarian cancer? Yes   Did any woman in your family have breast cancer before age 50 y? No   Do you have 2 or more relatives with breast and/or ovarian cancer? No   Do you have 2 or more relatives with breast and/or bowel cancer? No     {If any of the questions to the BCRA (FHS-7) are answered yes, consider ordering referral for genetic counseling (Optional) :206036::\"click delete button to remove this line now\"}  {AMB Mammogram Decision Support (Optional) :492512}  Pertinent mammograms are reviewed under the imaging tab.    History of abnormal Pap smear: { :101951}  PAP / HPV Latest Ref Rng & Units 2/4/2022 1/24/2018   PAP   Negative for Intraepithelial Lesion or Malignancy (NILM) -   PAP (Historical) - - NIL   HPV16 Negative Negative -   HPV18 Negative Negative -   HRHPV Negative Negative -     Reviewed and updated as needed this visit by clinical staff   Tobacco  Allergies  Meds              Reviewed and updated as needed this visit by Provider                 {HISTORY OPTIONS (Optional):580804}    Review of Systems   Constitutional: Negative for chills and fever.   HENT: Positive for congestion. Negative for ear pain, hearing loss and sore throat.    Eyes: Negative for pain and visual disturbance.   Respiratory: Negative for cough and shortness of breath.    Cardiovascular: Negative for chest " "pain, palpitations and peripheral edema.   Gastrointestinal: Negative for abdominal pain, constipation, diarrhea, heartburn, hematochezia and nausea.   Breasts:  Positive for tenderness and discharge. Negative for breast mass.   Genitourinary: Positive for vaginal discharge. Negative for dysuria, frequency, genital sores, hematuria, pelvic pain, urgency and vaginal bleeding.   Musculoskeletal: Negative for arthralgias, joint swelling and myalgias.   Skin: Negative for rash.   Neurological: Negative for dizziness, weakness, headaches and paresthesias.   Psychiatric/Behavioral: Negative for mood changes. The patient is nervous/anxious.      {FEMALE ROS (Optional):750207}     OBJECTIVE:   /70   Pulse 80   Temp 97  F (36.1  C) (Tympanic)   Resp 16   Ht 1.689 m (5' 6.5\")   Wt 72.6 kg (160 lb)   SpO2 96%   BMI 25.44 kg/m    Physical Exam  {Exam Choices (Optional):977271}    {Diagnostic Test Results (Optional):124783::\"Diagnostic Test Results:\",\"Labs reviewed in Epic\"}    ASSESSMENT/PLAN:   {Diag Picklist:093735}    {Patient advised of split billing (Optional):354378}      COUNSELING:  {FEMALE COUNSELING MESSAGES:053549::\"Reviewed preventive health counseling, as reflected in patient instructions\"}      BMI:   Estimated body mass index is 25.44 kg/m  as calculated from the following:    Height as of this encounter: 1.689 m (5' 6.5\").    Weight as of this encounter: 72.6 kg (160 lb).   {Weight Management Plan needed for ACO:973553}      She reports that she has never smoked. She has never used smokeless tobacco.      {Counseling Resources  [place] Preventive Services Task Force  Cholesterol Screening  Health diet/nutrition  Pooled Cohorts Equation Calculator  USDA's MyPlate  ASA Prophylaxis  Lung CA Screening  Osteoporosis prevention/bone health :568653}  {Breast Cancer Risk Calculator  BRCA-Related Cancer Risk Assessment FHS-7 Tool :226503}  Kristen M. Kehr, PA-C  [facility]  "

## 2023-01-14 ENCOUNTER — LAB (OUTPATIENT)
Dept: LAB | Facility: CLINIC | Age: 33
End: 2023-01-14
Payer: COMMERCIAL

## 2023-01-14 DIAGNOSIS — E03.9 HYPOTHYROIDISM, UNSPECIFIED TYPE: ICD-10-CM

## 2023-01-14 PROCEDURE — 36415 COLL VENOUS BLD VENIPUNCTURE: CPT

## 2023-01-14 PROCEDURE — 84443 ASSAY THYROID STIM HORMONE: CPT

## 2023-01-16 DIAGNOSIS — E03.9 HYPOTHYROIDISM, UNSPECIFIED TYPE: ICD-10-CM

## 2023-01-16 LAB — TSH SERPL DL<=0.005 MIU/L-ACNC: 2.55 MU/L (ref 0.4–4)

## 2023-01-16 RX ORDER — LEVOTHYROXINE SODIUM 88 UG/1
88 TABLET ORAL DAILY
Qty: 90 TABLET | Refills: 2 | Status: SHIPPED | OUTPATIENT
Start: 2023-01-16 | End: 2023-08-22

## 2023-02-16 ENCOUNTER — MEDICAL CORRESPONDENCE (OUTPATIENT)
Dept: HEALTH INFORMATION MANAGEMENT | Facility: CLINIC | Age: 33
End: 2023-02-16

## 2023-08-22 DIAGNOSIS — E03.9 HYPOTHYROIDISM, UNSPECIFIED TYPE: ICD-10-CM

## 2023-08-22 RX ORDER — LEVOTHYROXINE SODIUM 88 UG/1
88 TABLET ORAL DAILY
Qty: 90 TABLET | Refills: 0 | Status: SHIPPED | OUTPATIENT
Start: 2023-08-22 | End: 2024-01-08

## 2023-11-15 ENCOUNTER — PATIENT OUTREACH (OUTPATIENT)
Dept: CARE COORDINATION | Facility: CLINIC | Age: 33
End: 2023-11-15
Payer: COMMERCIAL

## 2023-11-29 ENCOUNTER — PATIENT OUTREACH (OUTPATIENT)
Dept: CARE COORDINATION | Facility: CLINIC | Age: 33
End: 2023-11-29
Payer: COMMERCIAL

## 2024-01-08 ENCOUNTER — OFFICE VISIT (OUTPATIENT)
Dept: OBGYN | Facility: CLINIC | Age: 34
End: 2024-01-08
Payer: COMMERCIAL

## 2024-01-08 VITALS
DIASTOLIC BLOOD PRESSURE: 78 MMHG | HEART RATE: 97 BPM | WEIGHT: 151 LBS | HEIGHT: 67 IN | BODY MASS INDEX: 23.7 KG/M2 | SYSTOLIC BLOOD PRESSURE: 115 MMHG | OXYGEN SATURATION: 97 %

## 2024-01-08 DIAGNOSIS — E03.9 HYPOTHYROIDISM, UNSPECIFIED TYPE: ICD-10-CM

## 2024-01-08 DIAGNOSIS — Z01.419 ENCOUNTER FOR GYNECOLOGICAL EXAMINATION WITHOUT ABNORMAL FINDING: Primary | ICD-10-CM

## 2024-01-08 PROBLEM — O09.00 SUPERVISION OF PREGNANCY WITH HISTORY OF INFERTILITY: Status: RESOLVED | Noted: 2022-02-04 | Resolved: 2024-01-08

## 2024-01-08 PROBLEM — O42.90 PROM (PREMATURE RUPTURE OF MEMBRANES): Status: RESOLVED | Noted: 2022-08-17 | Resolved: 2024-01-08

## 2024-01-08 PROBLEM — Z23 NEED FOR TDAP VACCINATION: Status: RESOLVED | Noted: 2022-01-14 | Resolved: 2024-01-08

## 2024-01-08 PROCEDURE — 99395 PREV VISIT EST AGE 18-39: CPT | Performed by: NURSE PRACTITIONER

## 2024-01-08 PROCEDURE — 36415 COLL VENOUS BLD VENIPUNCTURE: CPT | Performed by: NURSE PRACTITIONER

## 2024-01-08 PROCEDURE — 99213 OFFICE O/P EST LOW 20 MIN: CPT | Mod: 25 | Performed by: NURSE PRACTITIONER

## 2024-01-08 PROCEDURE — 84443 ASSAY THYROID STIM HORMONE: CPT | Performed by: NURSE PRACTITIONER

## 2024-01-08 PROCEDURE — 84439 ASSAY OF FREE THYROXINE: CPT | Performed by: NURSE PRACTITIONER

## 2024-01-08 RX ORDER — LEVOTHYROXINE SODIUM 88 UG/1
88 TABLET ORAL DAILY
Qty: 90 TABLET | Refills: 3 | Status: SHIPPED | OUTPATIENT
Start: 2024-01-08 | End: 2024-01-09 | Stop reason: DRUGHIGH

## 2024-01-08 ASSESSMENT — ENCOUNTER SYMPTOMS
SHORTNESS OF BREATH: 0
FREQUENCY: 0
CHILLS: 0
WEAKNESS: 0
BREAST MASS: 0
ARTHRALGIAS: 0
ABDOMINAL PAIN: 0
JOINT SWELLING: 0
NERVOUS/ANXIOUS: 1
CONSTIPATION: 0
NAUSEA: 0
HEADACHES: 0
COUGH: 0
PALPITATIONS: 0
HEARTBURN: 0
FEVER: 0
DYSURIA: 0
HEMATOCHEZIA: 0
MYALGIAS: 0
HEMATURIA: 0
SORE THROAT: 0
DIARRHEA: 0
DIZZINESS: 0
EYE PAIN: 0
PARESTHESIAS: 0

## 2024-01-08 NOTE — PATIENT INSTRUCTIONS
If you have any questions regarding your visit, Please contact your care team.     "[employer] Services: [phone]  To Schedule an Appointment 24/7  Call: [phone]LE[facility]ONE [facility]     Vanessa Zavala- APRN CNP      [facility]-Surgery Scheduler  [facility] Scheduler         Monday 7:30am-2:00pm    Tuesday 7:30am-4:00pm    Wednesday 7:30am-2:00pm    Thursday 7:30am-11:00am    Friday 7:30am-2:00pm [address] [address]  Phone- [phone]   Fax- [phone]     Imaging Scheduling all locations  [phone]    [facility]  [address] [place] [address]   [place] [address] [phone]         Urgent Care locations:  [facility]   Monday-Friday  10 am - 8 pm  Saturday and Sunday   9 am - 5 pm     [phone]   If you need a medication refill, please contact your pharmacy. Please allow 3 business days for your refill to be completed.  As always, Thank you for trusting us with your healthcare needs!      see additional instructions from your care team below

## 2024-01-08 NOTE — PROGRESS NOTES
SUBJECTIVE:   CASSIE is a 33 year old, presenting for the following:  Physical      Healthy Habits:     Getting at least 3 servings of Calcium per day:  Yes    Bi-annual eye exam:  Yes    Dental care twice a year:  Yes    Sleep apnea or symptoms of sleep apnea:  None    Diet:  Regular (no restrictions)    Frequency of exercise:  1 day/week    Duration of exercise:  15-30 minutes    Taking medications regularly:  Yes    Medication side effects:  None    Additional concerns today:  No      Hypothyroidism managed by primary care, she is due for testing.  Patient is planning to start attempting pregnancy soon, last was IVF. Their plan is to try on their own for 6-12 months and if they do not conceive, likely plan to use frozen embryo. Hoping to not be as stressed out this time around. We will check TSH today as she is attempting pregnancy soon.    Today's PHQ-2 Score:       2024     1:15 PM   PHQ-2 (  [employer])   Q1: Little interest or pleasure in doing things 0   Q2: Feeling down, depressed or hopeless 0   PHQ-2 Score 0   Q1: Little interest or pleasure in doing things Not at all   Q2: Feeling down, depressed or hopeless Not at all   PHQ-2 Score 0     Social History     Tobacco Use    Smoking status: Never    Smokeless tobacco: Never   Substance Use Topics    Alcohol use: Not Currently         2024     1:15 PM   Alcohol Use   Prescreen: >3 drinks/day or >7 drinks/week? No     Reviewed orders with patient.  Reviewed health maintenance and updated orders accordingly - Yes  Patient Active Problem List   Diagnosis    Chronic allergic rhinitis, unspecified seasonality, unspecified trigger    Hypothyroidism, unspecified type    Rh negative state in antepartum period     Past Surgical History:   Procedure Laterality Date     SECTION N/A 2022    Procedure:  SECTION;  Surgeon: Tete Jason MD;  [facility]       Social History     Tobacco Use    Smoking status: Never    Smokeless  tobacco: Never   Substance Use Topics    Alcohol use: Not Currently     Family History   Problem Relation Age of Onset    Depression Mother     Breast Cancer Mother     Thyroid Disease Mother     Skin Cancer Mother     Diabetes Father     Thyroid Disease Father     Kidney Disease Father     Skin Cancer Father     Heart Disease Father     No Known Problems Brother     Leukemia Maternal Grandmother     Cerebrovascular Disease Maternal Grandfather     Emphysema Paternal Grandmother     Kidney failure Paternal Grandfather     Glaucoma No family hx of     Macular Degeneration No family hx of     Colon Cancer No family hx of            Breast Cancer Screening:    FHS-7:       12/15/2022     7:49 AM   Breast CA Risk Assessment (FHS-7)   Did any of your first-degree relatives have breast or ovarian cancer? Yes   Did any of your relatives have bilateral breast cancer? No   Did any man in your family have breast cancer? No   Did any woman in your family have breast and ovarian cancer? Yes   Did any woman in your family have breast cancer before age 50 y? No   Do you have 2 or more relatives with breast and/or ovarian cancer? No   Do you have 2 or more relatives with breast and/or bowel cancer? No     Patient under 40 years of age: Routine Mammogram Screening not recommended.   Pertinent mammograms are reviewed under the imaging tab.    History of abnormal Pap smear: NO - age 30-65 PAP every 5 years with negative HPV co-testing recommended      Latest Ref Rng & Units 2/4/2022     3:05 PM 1/24/2018     5:22 PM   PAP / HPV   PAP  Negative for Intraepithelial Lesion or Malignancy (NILM)     PAP (Historical)   NIL    HPV 16 DNA Negative Negative     HPV 18 DNA Negative Negative     Other HR HPV Negative Negative       Reviewed and updated as needed this visit by clinical staff   Tobacco  Allergies  Meds   Med Hx  Surg Hx  Fam Hx  Soc Hx        Reviewed and updated as needed this visit by Provider    Allergies  Meds        "      Past Medical History:   Diagnosis Date    Hx of abnormal cervical Pap smear     ASCUS in ; nl pap in 2015- See care everywhere for details.    Migraine     Other specified hypothyroidism       Past Surgical History:   Procedure Laterality Date     SECTION N/A 2022    Procedure:  SECTION;  Surgeon: Tete Jason MD;  Location:  [facility]       Review of Systems   Constitutional:  Negative for chills and fever.   HENT:  Positive for congestion. Negative for ear pain, hearing loss and sore throat.    Eyes:  Negative for pain and visual disturbance.   Respiratory:  Negative for cough and shortness of breath.    Cardiovascular:  Negative for chest pain, palpitations and peripheral edema.   Gastrointestinal:  Negative for abdominal pain, constipation, diarrhea, heartburn, hematochezia and nausea.   Breasts:  Negative for tenderness, breast mass and discharge.   Genitourinary:  Negative for dysuria, frequency, genital sores, hematuria, pelvic pain, urgency, vaginal bleeding and vaginal discharge.   Musculoskeletal:  Negative for arthralgias, joint swelling and myalgias.   Skin:  Negative for rash.   Neurological:  Negative for dizziness, weakness, headaches and paresthesias.   Psychiatric/Behavioral:  Negative for mood changes. The patient is nervous/anxious.       OBJECTIVE:   /78 (BP Location: Right arm, Patient Position: Sitting, Cuff Size: Adult Regular)   Pulse 97   Ht 1.689 m (5' 6.5\")   Wt 68.5 kg (151 lb)   LMP 2024 (Exact Date)   SpO2 97%   BMI 24.01 kg/m    Physical Exam  GENERAL: healthy, alert and no distress  NECK: no adenopathy, no asymmetry, masses, or scars and thyroid normal to palpation  RESP: lungs clear to auscultation - no rales, rhonchi or wheezes  BREAST: normal without masses, tenderness or nipple discharge and no palpable axillary masses or adenopathy  CV: regular rate and rhythm, normal S1 S2, no S3 or S4, no murmur, click or rub, no peripheral " edema and peripheral pulses strong  ABDOMEN: soft, nontender, no hepatosplenomegaly, no masses and bowel sounds normal   (female): Declined today  MS: no gross musculoskeletal defects noted, no edema  SKIN: no suspicious lesions or rashes  NEURO: Normal strength and tone, mentation intact and speech normal  PSYCH: mentation appears normal, affect normal/bright    ASSESSMENT/PLAN:   (Z01.419) Encounter for gynecological examination without abnormal finding  (primary encounter diagnosis)  Comment: Health Maintenance reviewed. Patient will start PNV. Has started to track cycles and they have been monthly. Plan to attempt pregnancy x 6-12 months and would then follow up up with RE to use frozen embryo if no spontaneous pregnancy.  Start PNV. Can notify me with questions as they attempt pregnancy if they arise.    (E03.9) Hypothyroidism, unspecified type  Comment: Refill current dose of medication and check TSH today. If dose adjustment needed, will send in new prescription and notify patient. Discussed monitoring of TSH in pregnancy and likely need for dose adjustments.  Plan: TSH with free T4 reflex, levothyroxine         (SYNTHROID/LEVOTHROID) 88 MCG tablet         COUNSELING:  Reviewed preventive health counseling, as reflected in patient instructions  Special attention given to:        Regular exercise       Healthy diet/nutrition       Family planning       Folic Acid        She reports that she has never smoked. She has never used smokeless tobacco.          JESUS Carrasquillo CNP  [facility]

## 2024-01-09 DIAGNOSIS — E03.9 HYPOTHYROIDISM, UNSPECIFIED TYPE: ICD-10-CM

## 2024-01-09 LAB
T4 FREE SERPL-MCNC: 1.41 NG/DL (ref 0.9–1.7)
TSH SERPL DL<=0.005 MIU/L-ACNC: 5.44 UIU/ML (ref 0.3–4.2)

## 2024-01-09 RX ORDER — LEVOTHYROXINE SODIUM 100 UG/1
100 TABLET ORAL DAILY
Qty: 30 TABLET | Refills: 2 | Status: SHIPPED | OUTPATIENT
Start: 2024-01-09 | End: 2024-03-18

## 2024-01-09 RX ORDER — LEVOTHYROXINE SODIUM 100 UG/1
100 TABLET ORAL DAILY
Qty: 90 TABLET | OUTPATIENT
Start: 2024-01-09

## 2024-01-09 NOTE — TELEPHONE ENCOUNTER
Requested Prescriptions   Pending Prescriptions Disp Refills    levothyroxine (SYNTHROID/LEVOTHROID) 100 MCG tablet [Pharmacy Med Name: LEVOTHYROXINE 0.100MG (100MCG) TAB] 90 tablet      Sig: TAKE 1 TABLET(100 MCG) BY MOUTH DAILY       Thyroid Protocol Failed - 1/9/2024  2:53 PM        Failed - Recent (12 mo) or future (30 days) visit within the authorizing provider's specialty     The patient must have completed an in-person or virtual visit within the past 12 months or has a future visit scheduled within the next 90 days with the authorizing provider s specialty.  Urgent care and e-visits do not quality as an office visit for this protocol.          Failed - Normal TSH on file in past 12 months     Recent Labs   Lab Test 01/08/24  1340   TSH 5.44*              Passed - Patient is 12 years or older        Passed - Medication is active on med list        Passed - No active pregnancy on record     If patient is pregnant or has had a positive pregnancy test, please check TSH.          Passed - No positive pregnancy test in past 12 months     If patient is pregnant or has had a positive pregnancy test, please check TSH.             Pt last seen yesterday  Last prescribed 1/9/2024 for 30 tablets with 2 refills,    Pharmacy/insurance requesting a 90 day supply-routing to provider to review/advise on change quantity.    Katarina Cortés RN on 1/9/2024 at 2:56 PM

## 2024-01-09 NOTE — TELEPHONE ENCOUNTER
90 day supply not appropriate at this time. Needs to recheck labs in 8 weeks and then consider 90 day supply is TSH normal. Vanessa LEE CNP

## 2024-03-15 ENCOUNTER — LAB (OUTPATIENT)
Dept: LAB | Facility: CLINIC | Age: 34
End: 2024-03-15
Payer: COMMERCIAL

## 2024-03-15 DIAGNOSIS — E03.9 HYPOTHYROIDISM, UNSPECIFIED TYPE: ICD-10-CM

## 2024-03-15 PROCEDURE — 36415 COLL VENOUS BLD VENIPUNCTURE: CPT

## 2024-03-15 PROCEDURE — 84443 ASSAY THYROID STIM HORMONE: CPT

## 2024-03-16 LAB — TSH SERPL DL<=0.005 MIU/L-ACNC: 2.97 UIU/ML (ref 0.3–4.2)

## 2024-03-18 DIAGNOSIS — E03.9 HYPOTHYROIDISM, UNSPECIFIED TYPE: ICD-10-CM

## 2024-03-18 RX ORDER — LEVOTHYROXINE SODIUM 100 UG/1
100 TABLET ORAL DAILY
Qty: 90 TABLET | Refills: 2 | Status: SHIPPED | OUTPATIENT
Start: 2024-03-18

## 2024-04-23 ENCOUNTER — MYC MEDICAL ADVICE (OUTPATIENT)
Dept: FAMILY MEDICINE | Facility: CLINIC | Age: 34
End: 2024-04-23
Payer: COMMERCIAL

## 2024-04-23 DIAGNOSIS — M79.673 PAIN OF FOOT, UNSPECIFIED LATERALITY: Primary | ICD-10-CM

## 2024-04-24 NOTE — TELEPHONE ENCOUNTER
Routing to PCP if patient should be seen in primary care or if an ortho/ podiatry referral can be placed.    Analia BURNETTEN, RN

## 2024-05-14 ENCOUNTER — OFFICE VISIT (OUTPATIENT)
Dept: PODIATRY | Facility: CLINIC | Age: 34
End: 2024-05-14
Attending: PHYSICIAN ASSISTANT
Payer: COMMERCIAL

## 2024-05-14 VITALS — SYSTOLIC BLOOD PRESSURE: 110 MMHG | HEART RATE: 90 BPM | DIASTOLIC BLOOD PRESSURE: 70 MMHG

## 2024-05-14 DIAGNOSIS — G57.62 MORTON'S NEUROMA OF LEFT FOOT: Primary | ICD-10-CM

## 2024-05-14 PROBLEM — G44.209 TENSION HEADACHE: Status: ACTIVE | Noted: 2024-05-14

## 2024-05-14 PROBLEM — N20.0 KIDNEY STONES: Status: ACTIVE | Noted: 2024-05-14

## 2024-05-14 PROBLEM — Z91.09 ENVIRONMENTAL ALLERGIES: Status: ACTIVE | Noted: 2024-05-14

## 2024-05-14 PROBLEM — J45.990 EXERCISE-INDUCED ASTHMA: Status: ACTIVE | Noted: 2024-05-14

## 2024-05-14 PROCEDURE — 99203 OFFICE O/P NEW LOW 30 MIN: CPT | Mod: 25 | Performed by: PODIATRIST

## 2024-05-14 PROCEDURE — 64455 NJX AA&/STRD PLTR COM DG NRV: CPT | Mod: LT | Performed by: PODIATRIST

## 2024-05-14 RX ADMIN — LIDOCAINE HYDROCHLORIDE 0.5 ML: 10 INJECTION, SOLUTION INFILTRATION; PERINEURAL at 17:25

## 2024-05-14 NOTE — LETTER
5/14/2024         RE: Lizabeth Dye  [address]  [place] [address]        Dear Colleague,    Thank you for referring your patient, Lizabeth Dye, to the [facility]. Please see a copy of my visit note below.    Medium Joint Injection/Arthrocentesis    Date/Time: 5/14/2024 5:25 PM    Performed by: Darrius May DPM  Authorized by: Darrius May DPM    Indications:  Pain  Needle Size comment:  27g  Guidance: surface landmarks    Approach:  Dorsal  Location comment:  Left foot-Miguel   Medications:  10 mg triamcinolone acetonide 10 MG/ML; 0.5 mL lidocaine 1 %  Procedure discussed: discussed risks, benefits, and alternatives    Consent Given by:  Patient  Timeout: timeout called immediately prior to procedure    Prep: patient was prepped and draped in usual sterile fashion      Subjective:    Pt is seen today in consult from Kristen Kehr.  Pt has pain left third interspace.  This started around December or January.  Pain aggravated by activity and relieved by rest.  She feels as though there is something in there.  Denies trauma.  Denies edema erythema or ecchymosis.  Denies increased deformity does have some odd sensations.  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  Denies pain anywhere else in her feet.    ROS: See above         Allergies   Allergen Reactions     No Clinical Screening - See Comments Itching     Cats,dogs       Current Outpatient Medications   Medication Sig Dispense Refill     levothyroxine (SYNTHROID/LEVOTHROID) 100 MCG tablet Take 1 tablet (100 mcg) by mouth daily 90 tablet 2     Prenatal Vit-Fe Fumarate-FA (PRENATAL MULTIVITAMIN W/IRON) 27-0.8 MG tablet Take 1 tablet by mouth daily (Patient not taking: Reported on 1/8/2024)       valACYclovir (VALTREX) 1000 mg tablet Take 1 tablet (1,000 mg) by mouth 3 times daily for 7 days 21 tablet 0       Patient Active Problem List   Diagnosis     Chronic allergic rhinitis, unspecified  seasonality, unspecified trigger     Hypothyroidism, unspecified type     Rh negative state in antepartum period     Tension headache     Normocytic anemia     Kidney stones     High risk HPV infection     Exercise-induced asthma     Environmental allergies     Abnormal Pap smear of cervix       Past Medical History:   Diagnosis Date     Hx of abnormal cervical Pap smear     ASCUS in ; nl pap in 2015- See care everywhere for details.     Migraine      Other specified hypothyroidism        Past Surgical History:   Procedure Laterality Date      SECTION N/A 2022    Procedure:  SECTION;  Surgeon: Tete Jason MD;  Location:  [facility]D       Family History   Problem Relation Age of Onset     Depression Mother      Breast Cancer Mother      Thyroid Disease Mother      Skin Cancer Mother      Diabetes Father      Thyroid Disease Father      Kidney Disease Father      Skin Cancer Father      Heart Disease Father      No Known Problems Brother      Leukemia Maternal Grandmother      Cerebrovascular Disease Maternal Grandfather      Emphysema Paternal Grandmother      Kidney failure Paternal Grandfather      Glaucoma No family hx of      Macular Degeneration No family hx of      Colon Cancer No family hx of        Social History     Tobacco Use     Smoking status: Never     Smokeless tobacco: Never   Substance Use Topics     Alcohol use: Not Currently         Exam:    Vitals: /70   Pulse 90   BMI: There is no height or weight on file to calculate BMI.  Height: Data Unavailable    Constitutional/ general:  Pt is in no apparent distress, appears well-nourished.  Cooperative with history and physical exam.     Psych:  The patient answered questions appropriately.  Normal affect.  Seems to have reasonable expectations, in terms of treatment.     Lungs:  Non labored breathing, non labored speech. No cough.  No audible wheezing. Even, quiet breathing.       Vascular:  positive pedal pulses  bilaterally for both the DP and PT arteries.  CFT < 3 sec.  negative ankle edema.  positive pedal hair growth.    Neuro:  Alert and oriented x 3. Coordinated gait.  Light touch sensation is intact     Derm: Normal texture and turgor.  No erythema, ecchymosis, or cyanosis.      Musculoskeletal:     Patient is ambulatory without an assistive device or brace.   Normal arch with weightbearing on the right and left foot somewhat pronated.  No forefoot or rear foot deformities noted.  MS 5/5 all compartments.  Normal ROM all fore foot and rearfoot joints.  No equinus.   Pain upon palpation to the left third intermetatarsal space.    Negative azul's sign noted.  No erythema or subcutaneous masses noted.  No pain on the met heads or dorsal on the metatarsal necks.  Negative Tinnel's sign.      Assessment: Neuroma third Intermetatarsal Space left foot    Plan:  Disscussed etiology and treatment options at great detail.  Discussed left foot is more pronated than right making this foot more prone to a neuroma.  Discussed stiff supportive shoes.  She has these with a good over-the-counter arch support.  Also discussed orthotic.  We placed an order which is good for 3 months if she decides to get a pair.  Ice twice daily for 15 to 20 minutes.  Discussed injections and so was painful.  She is in agreement.  We had her sign a consent form.  We injected the left third interspace with 1 cc of Kenalog 10 mg and 1/2 cc of 1% lidocaine plain.  Patient Toller procedure well.  Discussed if still painful may consider another injection in a month to prevent neuroma from becoming chronic.  Discussed once chronic difficult to resolve without surgery.  Briefly discussed surgery.  Discussed other treatment options if this fails.  Could try walking boot for additional offloading.  RTC PRN.  Thank you for allowing me participate in the care of this patient.        Darrius May, CHATO, FACFAS          Again, thank you for allowing me to  participate in the care of your patient.        Sincerely,        Darrius May DPM

## 2024-05-14 NOTE — PROGRESS NOTES
Medium Joint Injection/Arthrocentesis    Date/Time: 5/14/2024 5:25 PM    Performed by: Darrius aMy DPM  Authorized by: Darrius May DPM    Indications:  Pain  Needle Size comment:  27g  Guidance: surface landmarks    Approach:  Dorsal  Location comment:  Left foot-Miguel   Medications:  10 mg triamcinolone acetonide 10 MG/ML; 0.5 mL lidocaine 1 %  Procedure discussed: discussed risks, benefits, and alternatives    Consent Given by:  Patient  Timeout: timeout called immediately prior to procedure    Prep: patient was prepped and draped in usual sterile fashion      Subjective:    Pt is seen today in consult from Kristen Kehr.  Pt has pain left third interspace.  This started around December or January.  Pain aggravated by activity and relieved by rest.  She feels as though there is something in there.  Denies trauma.  Denies edema erythema or ecchymosis.  Denies increased deformity does have some odd sensations.  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  Denies pain anywhere else in her feet.    ROS: See above         Allergies   Allergen Reactions    No Clinical Screening - See Comments Itching     Cats,dogs       Current Outpatient Medications   Medication Sig Dispense Refill    levothyroxine (SYNTHROID/LEVOTHROID) 100 MCG tablet Take 1 tablet (100 mcg) by mouth daily 90 tablet 2    Prenatal Vit-Fe Fumarate-FA (PRENATAL MULTIVITAMIN W/IRON) 27-0.8 MG tablet Take 1 tablet by mouth daily (Patient not taking: Reported on 1/8/2024)      valACYclovir (VALTREX) 1000 mg tablet Take 1 tablet (1,000 mg) by mouth 3 times daily for 7 days 21 tablet 0       Patient Active Problem List   Diagnosis    Chronic allergic rhinitis, unspecified seasonality, unspecified trigger    Hypothyroidism, unspecified type    Rh negative state in antepartum period    Tension headache    Normocytic anemia    Kidney stones    High risk HPV infection    Exercise-induced asthma    Environmental allergies     Abnormal Pap smear of cervix       Past Medical History:   Diagnosis Date    Hx of abnormal cervical Pap smear     ASCUS in ; nl pap in 2015- See care everywhere for details.    Migraine     Other specified hypothyroidism        Past Surgical History:   Procedure Laterality Date     SECTION N/A 2022    Procedure:  SECTION;  Surgeon: Tete Jason MD;  Location:  [facility]D       Family History   Problem Relation Age of Onset    Depression Mother     Breast Cancer Mother     Thyroid Disease Mother     Skin Cancer Mother     Diabetes Father     Thyroid Disease Father     Kidney Disease Father     Skin Cancer Father     Heart Disease Father     No Known Problems Brother     Leukemia Maternal Grandmother     Cerebrovascular Disease Maternal Grandfather     Emphysema Paternal Grandmother     Kidney failure Paternal Grandfather     Glaucoma No family hx of     Macular Degeneration No family hx of     Colon Cancer No family hx of        Social History     Tobacco Use    Smoking status: Never    Smokeless tobacco: Never   Substance Use Topics    Alcohol use: Not Currently         Exam:    Vitals: /70   Pulse 90   BMI: There is no height or weight on file to calculate BMI.  Height: Data Unavailable    Constitutional/ general:  Pt is in no apparent distress, appears well-nourished.  Cooperative with history and physical exam.     Psych:  The patient answered questions appropriately.  Normal affect.  Seems to have reasonable expectations, in terms of treatment.     Lungs:  Non labored breathing, non labored speech. No cough.  No audible wheezing. Even, quiet breathing.       Vascular:  positive pedal pulses bilaterally for both the DP and PT arteries.  CFT < 3 sec.  negative ankle edema.  positive pedal hair growth.    Neuro:  Alert and oriented x 3. Coordinated gait.  Light touch sensation is intact     Derm: Normal texture and turgor.  No erythema, ecchymosis, or cyanosis.       Musculoskeletal:     Patient is ambulatory without an assistive device or brace.   Normal arch with weightbearing on the right and left foot somewhat pronated.  No forefoot or rear foot deformities noted.  MS 5/5 all compartments.  Normal ROM all fore foot and rearfoot joints.  No equinus.   Pain upon palpation to the left third intermetatarsal space.    Negative azul's sign noted.  No erythema or subcutaneous masses noted.  No pain on the met heads or dorsal on the metatarsal necks.  Negative Tinnel's sign.      Assessment: Neuroma third Intermetatarsal Space left foot    Plan:  Disscussed etiology and treatment options at great detail.  Discussed left foot is more pronated than right making this foot more prone to a neuroma.  Discussed stiff supportive shoes.  She has these with a good over-the-counter arch support.  Also discussed orthotic.  We placed an order which is good for 3 months if she decides to get a pair.  Ice twice daily for 15 to 20 minutes.  Discussed injections and so was painful.  She is in agreement.  We had her sign a consent form.  We injected the left third interspace with 1 cc of Kenalog 10 mg and 1/2 cc of 1% lidocaine plain.  Patient Toller procedure well.  Discussed if still painful may consider another injection in a month to prevent neuroma from becoming chronic.  Discussed once chronic difficult to resolve without surgery.  Briefly discussed surgery.  Discussed other treatment options if this fails.  Could try walking boot for additional offloading.  RTC PRN.  Thank you for allowing me participate in the care of this patient.        Darrius May DPM, FACFAS

## 2024-05-14 NOTE — PATIENT INSTRUCTIONS
We wish you continued good healing. If you have any questions or concerns, please do not hesitate to contact us at  [phone]    [facility] [facility] (secure e-mail communication and access to your chart) to send a message or to make an appointment.    Please remember to call and schedule a follow up appointment if one was recommended at your earliest convenience.     PODIATRY CLINIC HOURS  TELEPHONE NUMBER    Dr. Darrius SAVAGEPROSEMARIE FACFAS        Clinics:  [facility] [place]  Tuesday 1PM-6PM  [place]  Wednesday 745AM-330PM  [place]  Monday 2nd,4th  830AM-4PM  Thursday/Friday 745AM-230PM     [facility]POINT[facility]  [phone]    [place] APPOINT[facility]  [phone]          If you need a medication refill, please contact us you may need lab work and/or a follow up visit prior to your refill (i.e. Antifungal medications).  If MRI needed please call Imaging at [phone]   HOW DO I GET MY KNEE SCOOTER? Knee scooters can be picked up at ANY Medical Supply stores with your knee scooter Prescription.  OR  Bring your signed prescription to an [facility] showroom.   Set up an appointment for your custom Orthotics. Call any Orthotics locations call [phone]         STERIOD INJECTIONS  The injection that you received today included a steroid. This is a strong steroid that decreases inflammation, reduces pain, and promotes healing. This type of steroid is different than anabolic steroids abused by body builders and professional athletes.   The injection also included a small dose of local anesthetic. This will result in local numbness for at least a few hours. The initial reduction in pain may simply be the effect of the anesthetic. The steroid will start to work as the local anesthetic is wearing off. It is hard to predict the degree of pain relief or the duration of benefit from a steroid injection. The injection may need to be  repeated depending on your body's response and ongoing pain and problems.   Some people experience a few days of increased pain after a steroid injection. This reaction is partly due to bleeding or bruising immediately after the injection. The localized pain may last for a few days and can be treated with extra strength tylenol, local ice, and decreased activity. The pain should resolve as the steroid starts to take effect which can take up to two weeks. Please do not hesitate to call if you continue having problems beyond what is described above.

## 2024-05-15 RX ORDER — LIDOCAINE HYDROCHLORIDE 10 MG/ML
0.5 INJECTION, SOLUTION INFILTRATION; PERINEURAL
Status: SHIPPED | OUTPATIENT
Start: 2024-05-14

## 2024-10-11 DIAGNOSIS — E03.9 HYPOTHYROIDISM, UNSPECIFIED TYPE: ICD-10-CM

## 2024-10-11 RX ORDER — LEVOTHYROXINE SODIUM 112 UG/1
112 TABLET ORAL DAILY
OUTPATIENT
Start: 2024-10-11

## 2024-10-11 NOTE — TELEPHONE ENCOUNTER
Requested Prescriptions   Pending Prescriptions Disp Refills    levothyroxine (SYNTHROID/LEVOTHROID) 112 MCG tablet [Pharmacy Med Name: LEVOTHYROXINE 0.112MG (112MCG) TABS] 90 tablet      Sig: TAKE 1 TABLET(112 MCG) BY MOUTH DAILY       Thyroid Protocol Passed - 10/11/2024  8:24 AM        Passed - Patient is 12 years or older        Passed - Recent (12 mo) or future (30 days) visit within the authorizing provider's specialty     The patient must have completed an in-person or virtual visit within the past 12 months or has a future visit scheduled within the next 90 days with the authorizing provider s specialty.  Urgent care and e-visits do not quality as an office visit for this protocol.          Passed - Medication is active on med list        Passed - Medication indicated for associated diagnosis     Medication is associated with one or more of the following diagnoses:  Hypothyroidism  Thyroid stimulating hormone suppression therapy  Thyroid cancer  Acquired atrophy of thyroid          Passed - Normal TSH on file in past 12 months     Recent Labs   Lab Test 03/15/24  1445   TSH 2.97              Passed - No active pregnancy on record     If patient is pregnant or has had a positive pregnancy test, please check TSH.          Passed - No positive pregnancy test in past 12 months     If patient is pregnant or has had a positive pregnancy test, please check TSH.             Pt last seen 1/8/2024 for annual    Last prescribed today for 30 tablets with 2 refills- pharmacy is requesting a 90 day supply instead for insurance coverage. RN routing to provider to advise.    Katarina Cortés RN on 10/11/2024 at 8:34 AM

## 2024-11-02 ENCOUNTER — NURSE TRIAGE (OUTPATIENT)
Dept: NURSING | Facility: CLINIC | Age: 34
End: 2024-11-02
Payer: COMMERCIAL

## 2024-11-02 NOTE — TELEPHONE ENCOUNTER
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 Patient had her hair dyed yesterday and is having an allergic reaction. Patient is not wanting to take anything without calling first. The skin at the edge of her face is red and puffy and is itchy but no pain. Patient has washed it a couple times to remove any residual chemicals has applied cold wash cloths.   Protocol recommends home care  Care advice given. Patient will call back with worsening symptoms.   Daria Boland RN   11/02/24 9:25 AM  [facility] Nurse Advisor      Reason for Disposition   Mild localized rash   Caller requesting information about medicine during pregnancy; adult is not ill AND triager answers question    Additional Information   Negative: [1] Sudden onset of rash (within last 2 hours) AND [2] difficulty breathing or swallowing   Negative: Sounds like a life-threatening emergency to the triager   Negative: [1] Localized purple or blood-colored spots or dots AND [2] not from injury or friction AND [3] fever   Negative: Patient sounds very sick or weak to the triager   Negative: [1] Red area or streak AND [2] fever   Negative: [1] Rash is painful to touch AND [2] fever   Negative: [1] Looks infected (spreading redness, pus) AND [2] large red area (> 2 in. or 5 cm)   Negative: [1] Looks infected (spreading redness, pus) AND [2] diabetes mellitus or weak immune system (e.g., HIV positive, cancer chemo, splenectomy, organ transplant, chronic steroids)   Negative: [1] Localized purple or blood-colored spots or dots AND [2] not from injury or friction AND [3] no fever   Negative: [1] Looks infected (spreading redness, pus) AND [2] no fever   Negative: Looks like a boil, infected sore, deep ulcer or other infected rash   Negative: [1] Localized rash is very painful AND [2] no fever   Negative: Genital area rash   Negative: Lyme disease suspected (e.g., bull's eye rash or tick bite / exposure)   Negative: [1] Applying cream or ointment AND [2] causes  severe itch, burning or pain   Negative: Medication patch causing local rash or itching   Negative: [1] Pimples (localized) AND [2 ] no improvement after using Care Advice   Negative: Tender bumps in armpits   Negative: [1] Severe localized itching AND [2] after 2 days of steroid cream   Negative: Localized rash present > 7 days   Negative: Red, moist, irritated area between skin folds (or under larger breasts)   Negative: [1] Localized area of skin darkening or thickening on lower legs or ankles AND [2] has NOT been evaluated by a doctor (or NP/PA)    Protocols used: Rash or Redness - Aknhgxnct-X-ED, Medication Question Call-A-AH

## 2024-11-04 ENCOUNTER — NURSE TRIAGE (OUTPATIENT)
Dept: NURSING | Facility: CLINIC | Age: 34
End: 2024-11-04

## 2024-11-04 NOTE — TELEPHONE ENCOUNTER
Nurse Triage SBAR    Is this a 2nd Level Triage? NO    Situation:  Swelling of the face     Background: Patient had an allergic reaction that started on Saturday. She states that she has been taking benadryl which is not effective. Patient states that her face is now starting to swell. Swelling is primarily around her eyes. She denies any dyspnea or swelling of her tongue    Nurse Triage SBAR    Assessment: Patient had an allergic reaction that started on Saturday. She states that she has been taking benadryl which is not effective. Patient states that her face is now starting to swell. Swelling is primarily around her eyes. She denies any dyspnea or swelling of her tongue. She took 50 mg of benadryl this morning     Nurse Triage SBAR    Protocol Recommended Disposition:   See PCP within 24 hours     Recommendation:  care advice given          Does the patient meet one of the following criteria for ADS visit consideration? 16+ years old, with an [facility] PCP     TIP  Providers, please consider if this condition is appropriate for management at one of our Acute and Diagnostic Services sites.     If patient is a good candidate, please use dotphrase <dot>triageresponse and select Refer to ADS to document.    Reason for Disposition   Swelling mainly around the eyes   Contact with pollen, other allergic substance or eyedrops   Eyelids are very swollen (shut or almost)    Additional Information   Negative: [1] Life-threatening reaction (anaphylaxis) in the past to similar substance (e.g., food, insect bite/sting, chemical, etc.) AND [2] < 2 hours since exposure   Negative: Unresponsive, passed out or very weak   Negative: Swollen tongue   Negative: Difficulty breathing or wheezing   Negative: Sounds like a life-threatening emergency to the triager   Negative: Followed a face injury   Negative: [1] Bee sting AND [2] within last 24 hours   Negative: Insect bite suspected   Negative: Swelling mainly of lip(s)   Negative:  Unresponsive, passed out or very weak   Negative: Difficulty breathing or wheezing   Negative: [1] Difficulty swallowing or slurred speech AND [2] sudden onset   Negative: Sounds like a life-threatening emergency to the triager   Negative: Chemical got in the eye   Negative: Recent injury to the eye   Negative: Entire face is swollen   Negative: Sacs of clear fluid (blisters) on whites of eyes (allergic cysts)   Negative: Chemical gets into the eye from fingers, contaminated object, spray, or splash   Negative: Eye pain   Negative: Reaction to antibiotic eye drops   Negative: Runny nose, nose itching, and sneezing also present   Negative: [1] Sacs of clear fluid (blisters) on whites of eyes AND [2] painful AND [3] not improved 2 hours after allergy treatment per guideline   Negative: [1] Blurred vision AND [2] new or worsening   Negative: Sacs of clear fluid (blisters) on whites of eyes or inner lids    Protocols used: Face Swelling-A-, Eye - Swelling-A-, Eye - Allergy-A-AH

## 2024-11-13 ENCOUNTER — LAB (OUTPATIENT)
Dept: LAB | Facility: CLINIC | Age: 34
End: 2024-11-13
Payer: COMMERCIAL

## 2024-11-13 DIAGNOSIS — E03.9 HYPOTHYROIDISM, UNSPECIFIED TYPE: ICD-10-CM

## 2024-11-13 PROCEDURE — 36415 COLL VENOUS BLD VENIPUNCTURE: CPT

## 2024-11-13 PROCEDURE — 84443 ASSAY THYROID STIM HORMONE: CPT

## 2024-11-14 DIAGNOSIS — E03.9 HYPOTHYROIDISM, UNSPECIFIED TYPE: Primary | ICD-10-CM

## 2024-11-14 DIAGNOSIS — E03.9 HYPOTHYROIDISM, UNSPECIFIED TYPE: ICD-10-CM

## 2024-11-14 LAB — TSH SERPL DL<=0.005 MIU/L-ACNC: 3.53 UIU/ML (ref 0.3–4.2)

## 2024-11-14 RX ORDER — LEVOTHYROXINE SODIUM 125 UG/1
125 TABLET ORAL DAILY
Refills: 0 | OUTPATIENT
Start: 2024-11-14

## 2024-11-14 RX ORDER — LEVOTHYROXINE SODIUM 125 UG/1
125 TABLET ORAL DAILY
Qty: 30 TABLET | Refills: 1 | Status: SHIPPED | OUTPATIENT
Start: 2024-11-14

## 2024-11-14 NOTE — TELEPHONE ENCOUNTER
Requested Prescriptions   Pending Prescriptions Disp Refills    levothyroxine (SYNTHROID/LEVOTHROID) 125 MCG tablet [Pharmacy Med Name: LEVOTHYROXINE 0.125MG (125MCG) TAB] 90 tablet      Sig: TAKE 1 TABLET(125 MCG) BY MOUTH DAILY       Thyroid Protocol Passed - 11/14/2024 10:45 AM        Passed - Patient is 12 years or older        Passed - Medication is active on med list        Passed - Recent (12 mo) or future (90 days) visit within the authorizing provider's specialty     The patient must have completed an in-person or virtual visit within the past 12 months or has a future visit scheduled within the next 90 days with the authorizing provider s specialty.  Urgent care and e-visits do not quality as an office visit for this protocol.          Passed - Medication indicated for associated diagnosis     Medication is associated with one or more of the following diagnoses:  Hypothyroidism  Thyroid stimulating hormone suppression therapy  Thyroid cancer  Acquired atrophy of thyroid          Passed - Normal TSH on file in past 12 months     Recent Labs   Lab Test 11/13/24  1458   TSH 3.53              Passed - No active pregnancy on record     If patient is pregnant or has had a positive pregnancy test, please check TSH.          Passed - No positive pregnancy test in past 12 months     If patient is pregnant or has had a positive pregnancy test, please check TSH.             Pt last seen 1/8/2024 for annual    Last prescribed today-pt and pharmacy requesting 90 tablets to be sent instead of 30, RN routing to provider to advise    Katarina Cortés RN on 11/14/2024 at 10:46 AM

## 2024-11-14 NOTE — TELEPHONE ENCOUNTER
90 supply not appropriate as we may be adjusting dose in 30 days. This was put into the prescription. Vanessa LEE CNP

## 2024-12-09 ENCOUNTER — PATIENT OUTREACH (OUTPATIENT)
Dept: CARE COORDINATION | Facility: CLINIC | Age: 34
End: 2024-12-09
Payer: COMMERCIAL

## 2024-12-15 ENCOUNTER — LAB (OUTPATIENT)
Dept: LAB | Facility: CLINIC | Age: 34
End: 2024-12-15
Payer: COMMERCIAL

## 2024-12-15 DIAGNOSIS — E03.9 HYPOTHYROIDISM, UNSPECIFIED TYPE: ICD-10-CM

## 2024-12-15 LAB — TSH SERPL DL<=0.005 MIU/L-ACNC: 1.16 UIU/ML (ref 0.3–4.2)

## 2024-12-15 PROCEDURE — 36415 COLL VENOUS BLD VENIPUNCTURE: CPT

## 2024-12-15 PROCEDURE — 84443 ASSAY THYROID STIM HORMONE: CPT

## 2024-12-23 ENCOUNTER — PATIENT OUTREACH (OUTPATIENT)
Dept: CARE COORDINATION | Facility: CLINIC | Age: 34
End: 2024-12-23
Payer: COMMERCIAL

## 2025-01-20 ENCOUNTER — OFFICE VISIT (OUTPATIENT)
Dept: OBGYN | Facility: CLINIC | Age: 35
End: 2025-01-20
Payer: COMMERCIAL

## 2025-01-20 VITALS
SYSTOLIC BLOOD PRESSURE: 120 MMHG | OXYGEN SATURATION: 94 % | HEART RATE: 93 BPM | BODY MASS INDEX: 22.95 KG/M2 | HEIGHT: 67 IN | WEIGHT: 146.2 LBS | DIASTOLIC BLOOD PRESSURE: 76 MMHG

## 2025-01-20 DIAGNOSIS — Z13.1 SCREENING FOR DIABETES MELLITUS: ICD-10-CM

## 2025-01-20 DIAGNOSIS — E03.9 HYPOTHYROIDISM, UNSPECIFIED TYPE: ICD-10-CM

## 2025-01-20 DIAGNOSIS — Z13.6 CARDIOVASCULAR SCREENING; LDL GOAL LESS THAN 130: ICD-10-CM

## 2025-01-20 DIAGNOSIS — Z01.419 ENCOUNTER FOR ANNUAL ROUTINE GYNECOLOGICAL EXAMINATION: Primary | ICD-10-CM

## 2025-01-20 PROCEDURE — 99395 PREV VISIT EST AGE 18-39: CPT | Performed by: NURSE PRACTITIONER

## 2025-01-20 RX ORDER — ESTRADIOL 2 MG/1
TABLET ORAL
COMMUNITY
Start: 2025-01-13

## 2025-01-20 RX ORDER — LEVOTHYROXINE SODIUM 125 UG/1
125 TABLET ORAL DAILY
Qty: 90 TABLET | Refills: 3 | Status: SHIPPED | OUTPATIENT
Start: 2025-01-20

## 2025-01-20 RX ORDER — ASPIRIN 81 MG/1
81 TABLET ORAL DAILY
COMMUNITY

## 2025-01-20 NOTE — PATIENT INSTRUCTIONS
If you have any questions regarding your visit, Please contact your care team.     [facility]: [phone]  To Schedule an Appointment 24/7  Call: [phone]     Vanessa Beckett APRN CNP      PauletteTrisha Kay-Surgery Scheduler  Tiki-Surgery Scheduler         Monday 7:30am-2:00pm    Tuesday 7:30am-4:00pm    Wednesday 7:30am-2:00pm    Thursday 7:30am-11:00am    Friday 7:30am-2:00pm [address] [place] [address] [place] [address]  Phone- [phone]   Fax- [phone]     Imaging Scheduling all locations  [phone]    [facility] Delivery  [address] [place] [address]   [place] [address] [phone]         Urgent Care locations:  [facility]   Monday-Friday  10 am - 8 pm  Saturday and Sunday   9 am - 5 pm     [phone]   If you need a medication refill, please contact your pharmacy. Please allow 3 business days for your refill to be completed.  As always, Thank you for trusting us with your healthcare needs!      see additional instructions from your care team below    Patient Education   Preventive Care Advice   This is general advice given by our system to help you stay healthy. However, your care team may have specific advice just for you. Please talk to your care team about your preventive care needs.  Nutrition  Eat 5 or more servings of fruits and vegetables each day.  Try wheat bread, brown rice and whole grain pasta (instead of white bread, rice, and pasta).  Get enough calcium and vitamin D. Check the label on foods and aim for 100% of the RDA (recommended daily allowance).  Lifestyle  Exercise at least 150 minutes each week  (30 minutes a day, 5 days a week).  Do muscle strengthening activities 2 days a week. These help control your weight and prevent disease.  No  smoking.  Wear sunscreen to prevent skin cancer.  Have a dental exam and cleaning every 6 months.  Yearly exams  See your health care team every year to talk about:  Any changes in your health.  Any medicines your care team has prescribed.  Preventive care, family planning, and ways to prevent chronic diseases.  Shots (vaccines)   HPV shots (up to age 26), if you've never had them before.  Hepatitis B shots (up to age 59), if you've never had them before.  COVID-19 shot: Get this shot when it's due.  Flu shot: Get a flu shot every year.  Tetanus shot: Get a tetanus shot every 10 years.  Pneumococcal, hepatitis A, and RSV shots: Ask your care team if you need these based on your risk.  Shingles shot (for age 50 and up)  General health tests  Diabetes screening:  Starting at age 35, Get screened for diabetes at least every 3 years.  If you are younger than age 35, ask your care team if you should be screened for diabetes.  Cholesterol test: At age 39, start having a cholesterol test every 5 years, or more often if advised.  Bone density scan (DEXA): At age 50, ask your care team if you should have this scan for osteoporosis (brittle bones).  Hepatitis C: Get tested at least once in your life.  STIs (sexually transmitted infections)  Before age 24: Ask your care team if you should be screened for STIs.  After age 24: Get screened for STIs if you're at risk. You are at risk for STIs (including HIV) if:  You are sexually active with more than one person.  You don't use condoms every time.  You or a partner was diagnosed with a sexually transmitted infection.  If you are at risk for HIV, ask about PrEP medicine to prevent HIV.  Get tested for HIV at least once in your life, whether you are at risk for HIV or not.  Cancer screening tests  Cervical cancer screening: If you have a cervix, begin getting regular cervical cancer screening tests starting at age 21.  Breast cancer scan (mammogram): If you've ever had breasts,  begin having regular mammograms starting at age 40. This is a scan to check for breast cancer.  Colon cancer screening: It is important to start screening for colon cancer at age 45.  Have a colonoscopy test every 10 years (or more often if you're at risk) Or, ask your provider about stool tests like a FIT test every year or Cologuard test every 3 years.  To learn more about your testing options, visit:   .  For help making a decision, visit:   https://bit.ly/lw66250.  Prostate cancer screening test: If you have a prostate, ask your care team if a prostate cancer screening test (PSA) at age 55 is right for you.  Lung cancer screening: If you are a current or former smoker ages 50 to 80, ask your care team if ongoing lung cancer screenings are right for you.    For informational purposes only. Not to replace the advice of your health care provider. Copyright   2023 [facility] [employer]. All rights reserved. Clinically reviewed by the [facility] Transitions Program. [employer]get[employer] 132183 - REV 01/24.

## 2025-01-20 NOTE — PROGRESS NOTES
SUBJECTIVE:   Lizabeth Dye  is a 34 year old, presenting for the following health issues:  Physical  HPI     Working with [facility] for infertility. Dose of Synthroid was adjusted based on TSH. They are monitoring her TSH levels regularly as well.         2025     8:54 AM 2024     1:15 PM   PHQ-2 (  [employer])   Q1: Little interest or pleasure in doing things 0 0   Q2: Feeling down, depressed or hopeless 0 0   PHQ-2 Score 0  0   Q1: Little interest or pleasure in doing things Not at all Not at all   Q2: Feeling down, depressed or hopeless Not at all Not at all   PHQ-2 Score 0 0       Patient-reported         Reviewed orders with patient.  Reviewed health maintenance and updated orders accordingly - Yes  Patient Active Problem List   Diagnosis    Chronic allergic rhinitis, unspecified seasonality, unspecified trigger    Hypothyroidism, unspecified type    Rh negative state in antepartum period    Tension headache    Normocytic anemia    Kidney stones    High risk HPV infection    Exercise-induced asthma    Environmental allergies    Abnormal Pap smear of cervix     Past Surgical History:   Procedure Laterality Date     SECTION N/A 2022    Procedure:  SECTION;  Surgeon: Tete Jason MD;  Location:  [facility]       Social History     Tobacco Use    Smoking status: Never    Smokeless tobacco: Never   Substance Use Topics    Alcohol use: Not Currently     Family History   Problem Relation Age of Onset    Depression Mother     Breast Cancer Mother     Thyroid Disease Mother     Skin Cancer Mother     Diabetes Father     Thyroid Disease Father     Kidney Disease Father     Skin Cancer Father     Heart Disease Father     No Known Problems Brother     Leukemia Maternal Grandmother     Cerebrovascular Disease Maternal Grandfather     Emphysema Paternal Grandmother     Kidney failure Paternal Grandfather     Glaucoma No family hx of     Macular Degeneration No family  "hx of     Colon Cancer No family hx of            Breast Cancer Screening:   Mammogram Screening - Patient under 40 years of age: Routine Mammogram Screening not recommended.   Pertinent mammograms are reviewed under the imaging tab.    History of abnormal Pap smear: YES - reflected in Problem List and Health Maintenance accordingly    Past Medical History:   Diagnosis Date    Hx of abnormal cervical Pap smear     ASCUS in ; nl pap in 2015- See care everywhere for details.    Migraine     Other specified hypothyroidism       Past Surgical History:   Procedure Laterality Date     SECTION N/A 2022    Procedure:  SECTION;  Surgeon: Tete Jason MD;  Location:  L[facility]D         Review of Systems  Constitutional, neuro, ENT, endocrine, pulmonary, cardiac, gastrointestinal, genitourinary, musculoskeletal, integument and psychiatric systems are negative, except as otherwise noted.    OBJECTIVE:   /76 (BP Location: Right arm, Patient Position: Sitting, Cuff Size: Adult Regular)   Pulse 93   Ht 1.689 m (5' 6.5\")   Wt 66.3 kg (146 lb 3.2 oz)   LMP 01/15/2025 (Exact Date)   SpO2 94%   BMI 23.24 kg/m    Physical Exam   GENERAL: alert and no distress  RESP: lungs clear to auscultation - no rales, rhonchi or wheezes  BREAST: normal without masses, tenderness or nipple discharge and no palpable axillary masses or adenopathy  CV: regular rate and rhythm, normal S1 S2, no S3 or S4, no murmur, click or rub, no peripheral edema  ABDOMEN: soft, nontender, no hepatosplenomegaly, no masses and bowel sounds normal   (female): Patient declined due to menses  MS: no gross musculoskeletal defects noted, no edema  SKIN: no suspicious lesions or rashes  NEURO: Normal strength and tone, mentation intact and speech normal  PSYCH: mentation appears normal, affect normal/bright      ASSESSMENT/PLAN:   Encounter for annual routine gynecological examination  Health Maintenance reviewed. "     Hypothyroidism, unspecified type  Refill at current dose, will adjust as needed if patient becomes pregnant or levels change.   - levothyroxine (SYNTHROID/LEVOTHROID) 125 MCG tablet; Take 1 tablet (125 mcg) by mouth daily.    Screening for diabetes mellitus  - Glucose; Future    CARDIOVASCULAR SCREENING; LDL GOAL LESS THAN 130  - Lipid panel reflex to direct LDL Fasting; Future    Counseling  Reviewed preventive health counseling, as reflected in patient instructions  Special attention given to:        Regular exercise       Healthy diet/nutrition       Family planning      Signed Electronically by: JESUS Carrasquillo CNP

## 2025-04-22 ENCOUNTER — MYC MEDICAL ADVICE (OUTPATIENT)
Dept: OBGYN | Facility: CLINIC | Age: 35
End: 2025-04-22
Payer: COMMERCIAL

## 2025-04-22 NOTE — TELEPHONE ENCOUNTER
"Pt is asking to have her thyroid levels checked now.    Dr. White had sent in a new rx for levothyroxine 137mcg for pt in the absence of JESUS Gray CNP, on 3/24/25 and advised to retest TSH in 2 months.    Pt had asked that Vanessa be the one to continue to manage her Synthroid since she knows her history.  Pt states she has been on the new dose of Synthroid for 4 weeks and was wondering if she can get her \"levels checked\".    Routing to Vanessa to advise.  I see there are future lab orders in pt's chart but just checking on timing of getting these labs done.    Julieta Tyler RN- OB/GYN group     "

## 2025-04-22 NOTE — TELEPHONE ENCOUNTER
Would recommend repeat TSH no sooner than 6 weeks after dose adjustment (8 weeks is ideal). Steady thyroid levels take at least that long to achieve. Vanessa LEE CNP

## 2025-04-24 ENCOUNTER — OFFICE VISIT (OUTPATIENT)
Dept: OPTOMETRY | Facility: CLINIC | Age: 35
End: 2025-04-24
Payer: COMMERCIAL

## 2025-04-24 DIAGNOSIS — Z01.00 ROUTINE EYE EXAM: Primary | ICD-10-CM

## 2025-04-24 DIAGNOSIS — H52.223 REGULAR ASTIGMATISM OF BOTH EYES: ICD-10-CM

## 2025-04-24 DIAGNOSIS — H52.13 MYOPIA OF BOTH EYES: ICD-10-CM

## 2025-04-24 ASSESSMENT — CONF VISUAL FIELD
OS_SUPERIOR_TEMPORAL_RESTRICTION: 0
OD_SUPERIOR_NASAL_RESTRICTION: 0
OD_SUPERIOR_TEMPORAL_RESTRICTION: 0
OS_NORMAL: 1
OS_INFERIOR_TEMPORAL_RESTRICTION: 0
OS_INFERIOR_NASAL_RESTRICTION: 0
METHOD: COUNTING FINGERS
OS_SUPERIOR_NASAL_RESTRICTION: 0
OD_INFERIOR_TEMPORAL_RESTRICTION: 0
OD_INFERIOR_NASAL_RESTRICTION: 0
OD_NORMAL: 1

## 2025-04-24 ASSESSMENT — REFRACTION_MANIFEST
OS_AXIS: 070
OD_SPHERE: -1.25
OS_SPHERE: -0.75
OD_CYLINDER: +0.75
OS_CYLINDER: +0.75
OS_SPHERE: -0.50
OS_AXIS: 065
OD_AXIS: 089
OD_SPHERE: -0.75
OS_CYLINDER: +0.50
OD_CYLINDER: +1.00
METHOD_AUTOREFRACTION: 1
OD_AXIS: 090

## 2025-04-24 ASSESSMENT — VISUAL ACUITY
OD_SC: 20/20
OS_SC: 20/20
OS_CC: 20/20
OD_SC+: -1
OD_SC: 20/30
METHOD: SNELLEN - LINEAR
OD_CC+: -1
OD_CC: 20/20
OD_CC: 20/20
OS_SC: 20/20
CORRECTION_TYPE: GLASSES
OS_CC: 20/20

## 2025-04-24 ASSESSMENT — REFRACTION_CURRENTRX
OS_BRAND: J&J ACUVUE OASYS BC 8.4, D 14.0
OD_SPHERE: -1.00
OS_SPHERE: -1.00
OD_BRAND: J&J ACUVUE OASYS BC 8.4, D 14.0

## 2025-04-24 ASSESSMENT — KERATOMETRY
OD_AXISANGLE2_DEGREES: 5
OD_K1POWER_DIOPTERS: 43.50
OS_AXISANGLE2_DEGREES: 164
OS_K2POWER_DIOPTERS: 45.00
OS_K1POWER_DIOPTERS: 43.75
OD_K2POWER_DIOPTERS: 44.75

## 2025-04-24 ASSESSMENT — REFRACTION_WEARINGRX
OD_CYLINDER: +0.50
OD_SPHERE: -1.00
OS_AXIS: 070
OS_SPHERE: -1.25
OD_AXIS: 090
SPECS_TYPE: SVL
OS_CYLINDER: +0.50

## 2025-04-24 ASSESSMENT — SLIT LAMP EXAM - LIDS
COMMENTS: NORMAL
COMMENTS: NORMAL

## 2025-04-24 ASSESSMENT — TONOMETRY
IOP_METHOD: APPLANATION
OS_IOP_MMHG: 14
OD_IOP_MMHG: 14

## 2025-04-24 ASSESSMENT — EXTERNAL EXAM - LEFT EYE: OS_EXAM: NORMAL

## 2025-04-24 ASSESSMENT — CUP TO DISC RATIO
OS_RATIO: 0.5
OD_RATIO: 0.4

## 2025-04-24 ASSESSMENT — EXTERNAL EXAM - RIGHT EYE: OD_EXAM: NORMAL

## 2025-04-24 NOTE — PROGRESS NOTES
Chief Complaint   Patient presents with    COMPREHENSIVE EYE EXAM    Contact Lens Re-fitting        Previous contact lens wearer? Yes: DIEGO Truong, Is out of lenses   Comfort of contact lenses :Good   Satisfied with current lenses:  She's been out for about a month         Last Eye Exam: 7/2022  Dilated Previously: Yes    What are you currently using to see?  contacts and glasses as back up. Currently wearing glasses, out of contacts     Distance Vision Acuity: Satisfied with vision    Near Vision Acuity: Satisfied with vision while reading and using computer with glasses or contacts or unaided.     Eye Comfort: good  Do you use eye drops? : No  Occupation or Hobbies: Teacher       Gayatri Apple Optometric Assistant      Medical, surgical and family histories reviewed and updated 4/24/2025.       OBJECTIVE: See Ophthalmology exam    ASSESSMENT:    ICD-10-CM    1. Routine eye exam  Z01.00 EYE EXAM (SIMPLE-NONBILLABLE)     REFRACTION     CONTACT LENS FITTING,BILAT w/ signed waiver      2. Myopia of both eyes  H52.13 EYE EXAM (SIMPLE-NONBILLABLE)     REFRACTION     CONTACT LENS FITTING,BILAT w/ signed waiver      3. Regular astigmatism of both eyes  H52.223 EYE EXAM (SIMPLE-NONBILLABLE)     REFRACTION     CONTACT LENS FITTING,BILAT w/ signed waiver         PLAN:     Patient Instructions   Fill glasses prescription  Contact lenses prescription released to patient today.    Return in 1-2 years for eye exam    Catarina Whitney, OD  [phone]

## 2025-04-24 NOTE — LETTER
4/24/2025      Lizabeth Dye  [address]  [facility] [address]      Dear Colleague,    Thank you for referring your patient, Lizabeth Dye, to the [facility]. Please see a copy of my visit note below.    Chief Complaint   Patient presents with     COMPREHENSIVE EYE EXAM     Contact Lens Re-fitting        Previous contact lens wearer? Yes: AV Oasys, Is out of lenses   Comfort of contact lenses :Good   Satisfied with current lenses:  She's been out for about a month         Last Eye Exam: 7/2022  Dilated Previously: Yes    What are you currently using to see?  contacts and glasses as back up. Currently wearing glasses, out of contacts     Distance Vision Acuity: Satisfied with vision    Near Vision Acuity: Satisfied with vision while reading and using computer with glasses or contacts or unaided.     Eye Comfort: good  Do you use eye drops? : No  Occupation or Hobbies: Teacher       Gayatri Apple Optometric Assistant      Medical, surgical and family histories reviewed and updated 4/24/2025.       OBJECTIVE: See Ophthalmology exam    ASSESSMENT:    ICD-10-CM    1. Routine eye exam  Z01.00 EYE EXAM (SIMPLE-NONBILLABLE)     REFRACTION     CONTACT LENS FITTING,BILAT w/ signed waiver      2. Myopia of both eyes  H52.13 EYE EXAM (SIMPLE-NONBILLABLE)     REFRACTION     CONTACT LENS FITTING,BILAT w/ signed waiver      3. Regular astigmatism of both eyes  H52.223 EYE EXAM (SIMPLE-NONBILLABLE)     REFRACTION     CONTACT LENS FITTING,BILAT w/ signed waiver         PLAN:     Patient Instructions   Fill glasses prescription  Contact lenses prescription released to patient today.    Return in 1-2 years for eye exam    Catarina Whitney, OD  [phone]                                                Again, thank you for allowing me to participate in the care of your patient.        Sincerely,        Catarina Whitney, OD    Electronically signed

## 2025-04-24 NOTE — PATIENT INSTRUCTIONS
Fill glasses prescription  Contact lenses prescription released to patient today.    Return in 1-2 years for eye exam    Catarina Whitney, OD  [phone]

## 2025-06-01 ENCOUNTER — LAB (OUTPATIENT)
Dept: LAB | Facility: CLINIC | Age: 35
End: 2025-06-01
Payer: COMMERCIAL

## 2025-06-01 DIAGNOSIS — E03.9 HYPOTHYROIDISM, UNSPECIFIED TYPE: ICD-10-CM

## 2025-06-01 PROCEDURE — 84443 ASSAY THYROID STIM HORMONE: CPT

## 2025-06-01 PROCEDURE — 36415 COLL VENOUS BLD VENIPUNCTURE: CPT

## 2025-06-02 LAB — TSH SERPL DL<=0.005 MIU/L-ACNC: 0.52 UIU/ML (ref 0.3–4.2)

## 2025-06-03 ENCOUNTER — RESULTS FOLLOW-UP (OUTPATIENT)
Dept: OBGYN | Facility: OTHER | Age: 35
End: 2025-06-03

## 2025-06-19 ENCOUNTER — MYC MEDICAL ADVICE (OUTPATIENT)
Dept: OBGYN | Facility: CLINIC | Age: 35
End: 2025-06-19
Payer: COMMERCIAL

## 2025-06-19 DIAGNOSIS — E03.9 HYPOTHYROIDISM, UNSPECIFIED TYPE: ICD-10-CM

## 2025-06-19 RX ORDER — LEVOTHYROXINE SODIUM 137 UG/1
TABLET ORAL
Qty: 90 TABLET | Refills: 0 | Status: SHIPPED | OUTPATIENT
Start: 2025-06-19

## 2025-06-19 NOTE — TELEPHONE ENCOUNTER
Requested Prescriptions   Pending Prescriptions Disp Refills    levothyroxine (SYNTHROID/LEVOTHROID) 137 MCG tablet [Pharmacy Med Name: LEVOTHYROXINE 0.137MG (137MCG) TAB] 90 tablet 0     Sig: TAKE 1 TABLET(137 MCG) BY MOUTH EVERY MORNING BEFORE BREAKFAST       Thyroid Protocol Failed - 6/19/2025  9:19 AM        Failed - Medication is active on med list and the sig matches. RN to manually verify dose and sig if red X/fail.     If the protocol passes (green check), you do not need to verify med dose and sig.    A prescription matches if they are the same clinical intention.    For Example: once daily and every morning are the same.    The protocol can not identify upper and lower case letters as matching and will fail.     For Example: Take 1 tablet (50 mg) by mouth daily     TAKE 1 TABLET (50 MG) BY MOUTH DAILY    For all fails (red x), verify dose and sig.    If the refill does match what is on file, the RN can still proceed to approve the refill request.       If they do not match, route to the appropriate provider.             Passed - Patient is 12 years or older        Passed - Recent (12 month) or future (90 days) visit with authorizing provider's specialty (provided they have been seen in the past 15 months)     The patient must have completed an in-person or virtual visit within the past 12 months or has a future visit scheduled within the next 90 days with the authorizing provider s specialty.  Urgent care and e-visits do not qualify as an office visit for this protocol.          Passed - Medication indicated for associated diagnosis     Medication is associated with one or more of the following diagnoses:  Hypothyroidism  Thyroid stimulating hormone suppression therapy  Thyroid cancer  Acquired atrophy of thyroid          Passed - Normal TSH on file in past 12 months     Recent Labs   Lab Test 06/01/25  1707   TSH 0.52              Passed - No active pregnancy on record     If patient is pregnant or has  had a positive pregnancy test, please check TSH.          Passed - No positive pregnancy test in past 12 months     If patient is pregnant or has had a positive pregnancy test, please check TSH.             Pt recently had a normal TSH.  Sig on Synthroid on med list matches the sig on selected me.    Prescription approved per [facility] Refill Protocol.    Julieta Tyler RN- OB/GYN group

## 2025-08-13 ENCOUNTER — DOCUMENTATION ONLY (OUTPATIENT)
Dept: FAMILY MEDICINE | Facility: CLINIC | Age: 35
End: 2025-08-13
Payer: COMMERCIAL

## 2025-08-13 DIAGNOSIS — E03.9 HYPOTHYROIDISM, UNSPECIFIED TYPE: Primary | ICD-10-CM

## (undated) DEVICE — BARRIER INTERCEED 3X4" 4350

## (undated) DEVICE — SU VICRYL 0 CT-1 36" J346H

## (undated) DEVICE — SOL WATER IRRIG 1000ML BOTTLE 07139-09

## (undated) DEVICE — SU MONOCRYL 4-0 PS-2 18" UND Y496G

## (undated) DEVICE — STRAP KNEE/BODY 31143004

## (undated) DEVICE — PREP CHLORAPREP 26ML TINTED ORANGE  260815

## (undated) DEVICE — GLOVE PROTEXIS BLUE W/NEU-THERA 7.0  2D73EB70

## (undated) DEVICE — GLOVE ESTEEM POWDER FREE SMT 6.5  2D72PT65

## (undated) DEVICE — SU MONOCRYL 0 CT-1 36" Y346H

## (undated) DEVICE — STOCKING SLEEVE COMPRESSION CALF LG

## (undated) DEVICE — PACK C-SECTION LF PL15OTA83B

## (undated) DEVICE — ESU GROUND PAD UNIVERSAL W/O CORD

## (undated) DEVICE — DRSG STERI STRIP 1/4X3" R1541

## (undated) DEVICE — SU PLAIN 2-0 CT 27" 853H

## (undated) DEVICE — SOL NACL 0.9% IRRIG 1000ML BOTTLE 07138-09

## (undated) DEVICE — CATH TRAY FOLEY 16FR BARDEX W/DRAIN BAG STATLOCK 300316A

## (undated) RX ORDER — MORPHINE SULFATE 2 MG/ML
INJECTION, SOLUTION INTRAMUSCULAR; INTRAVENOUS
Status: DISPENSED
Start: 2022-08-18

## (undated) RX ORDER — FENTANYL CITRATE 50 UG/ML
INJECTION, SOLUTION INTRAMUSCULAR; INTRAVENOUS
Status: DISPENSED
Start: 2022-08-18